# Patient Record
Sex: MALE | Race: BLACK OR AFRICAN AMERICAN | NOT HISPANIC OR LATINO | Employment: OTHER | URBAN - METROPOLITAN AREA
[De-identification: names, ages, dates, MRNs, and addresses within clinical notes are randomized per-mention and may not be internally consistent; named-entity substitution may affect disease eponyms.]

---

## 2017-06-29 ENCOUNTER — OFFICE VISIT (OUTPATIENT)
Dept: OPHTHALMOLOGY | Facility: CLINIC | Age: 72
End: 2017-06-29
Attending: OPHTHALMOLOGY
Payer: COMMERCIAL

## 2017-06-29 DIAGNOSIS — E11.9 TYPE 2 DIABETES MELLITUS WITHOUT RETINOPATHY (H): Primary | ICD-10-CM

## 2017-06-29 DIAGNOSIS — H04.123 DRY EYE SYNDROME, BILATERAL: ICD-10-CM

## 2017-06-29 DIAGNOSIS — Z96.1 PSEUDOPHAKIA: ICD-10-CM

## 2017-06-29 PROCEDURE — 92015 DETERMINE REFRACTIVE STATE: CPT | Mod: ZF

## 2017-06-29 PROCEDURE — 99213 OFFICE O/P EST LOW 20 MIN: CPT | Mod: ZF

## 2017-06-29 ASSESSMENT — REFRACTION_MANIFEST
OD_CYLINDER: +1.25
OS_ADD: +2.75
OS_SPHERE: -0.25
OD_ADD: +2.75
OS_AXIS: 160
OS_CYLINDER: +0.25
OD_SPHERE: -0.75
OD_AXIS: 140

## 2017-06-29 ASSESSMENT — SLIT LAMP EXAM - LIDS
COMMENTS: NORMAL
COMMENTS: NORMAL

## 2017-06-29 ASSESSMENT — VISUAL ACUITY
OD_SC+: -2
METHOD: SNELLEN - LINEAR
OS_SC: 20/20
OD_SC: 20/40

## 2017-06-29 ASSESSMENT — REFRACTION_WEARINGRX
OD_CYLINDER: +1.00
OS_AXIS: 160
OS_CYLINDER: +0.25
OD_AXIS: 145
OS_SPHERE: -0.25
OD_SPHERE: -0.50

## 2017-06-29 ASSESSMENT — CUP TO DISC RATIO
OS_RATIO: 0.3
OD_RATIO: 0.3

## 2017-06-29 ASSESSMENT — TONOMETRY
OS_IOP_MMHG: 10
IOP_METHOD: TONOPEN
OD_IOP_MMHG: 11

## 2017-06-29 ASSESSMENT — EXTERNAL EXAM - RIGHT EYE: OD_EXAM: NORMAL

## 2017-06-29 ASSESSMENT — EXTERNAL EXAM - LEFT EYE: OS_EXAM: NORMAL

## 2017-06-29 NOTE — NURSING NOTE
Chief Complaints and History of Present Illnesses   Patient presents with     Follow Up For     Type 2 diabetes mellitus without retinopathy (H)     HPI    Affected eye(s):  Both   Symptoms:     Blurred vision   No decreased vision         Do you have eye pain now?:  No      Comments:  BS: Okay per patient. Does not check them every day. 130 yesterday afternoon.  Lab Results       Component                Value               Date                       A1C                      6.9                 08/18/2014              Donna ESCOBAR 10:36 AM June 29, 2017

## 2017-06-29 NOTE — MR AVS SNAPSHOT
After Visit Summary   2017    Kong Haskins    MRN: [id number]           Patient Information     Date Of Birth          1945        Visit Information        Provider Department      2017 10:00 AM Abundio Fried MD; [facility]        Today's Diagnoses     Type 2 diabetes mellitus without retinopathy (H)    -  1    Pseudophakia - Both Eyes        Dry eye syndrome, bilateral           Follow-ups after your visit        Follow-up notes from your care team     Return in about 1 year (around 2018) for DFE.      Who to contact     Please call your clinic at [phone] to:    Ask questions about your health    Make or cancel appointments    Discuss your medicines    Learn about your test results    Speak to your doctor   If you have compliments or concerns about an experience at your clinic, or if you wish to file a complaint, please contact [facility] Patient Relations at [phone] or email us at [email or web address]         Additional Information About Your Visit        MyChart Information     [employer] is an electronic gateway that provides easy, online access to your medical records. With [employer], you can request a clinic appointment, read your test results, renew a prescription or communicate with your care team.     To sign up for [employer] visit the website at [email or web address]   You will be asked to enter the access code listed below, as well as some personal information. Please follow the directions to create your username and password.     Your access code is: [email or web address]  Expires: 2017  6:30 AM     Your access code will  in 90 days. If you need help or a new code, please contact your [facility] or call [phone] for assistance.        Care EveryWhere ID     This is your Care EveryWhere ID. This could be used by other organizations to access your [place]  medical records  [id number]         Blood Pressure from Last 3 Encounters:   08/18/14 132/72   06/02/14 116/67   05/12/14 115/73    Weight from Last 3 Encounters:   08/18/14 82.7 kg (182 lb 6.4 oz)   06/02/14 81.6 kg (180 lb)   05/17/13 80.8 kg (178 lb 3.2 oz)              Today, you had the following     No orders found for display       Primary Care Provider Office Phone # Fax #    Ezekiel Andrea MD Pema [phone]       [facility] [address] [facility] [address]        Equal Access to Services     AC ARCHER : Hadii tabitha hernandezo Soabhishek, waaxda luqadaha, qaybta kaalmada bess, stephany neal. So [facility] [place] [phone].    ATENCIÓN: Si habla español, tiene a lennon disposición servicios gratuitos de asistencia lingüística. Ll[facility] [phone].    We comply with applicable federal civil rights laws and [place] laws. We do not discriminate on the basis of race, color, national origin, age, disability sex, sexual orientation or gender identity.            Thank you!     Thank you for choosing EYE CLINIC  for your care. Our goal is always to provide you with excellent care. Hearing back from our patients is one way we can continue to improve our services. Please take a few minutes to complete the written survey that you may receive in the mail after your visit with us. Thank you!             Your Updated Medication List - Protect others around you: Learn how to safely use, store and throw away your medicines at www.disposemymeds.org.          This list is accurate as of: 6/29/17 11:24 AM.  Always use your most recent med list.                   Brand Name Dispense Instructions for use Diagnosis    ASPIRIN PO      Take 81 mg by mouth daily        blood glucose monitoring lancets     1 Box    Use to test daily    DM type 2 (diabetes mellitus, type 2) (H)       blood glucose monitoring test strip    PRISCILLA CONTOUR NEXT    100 each    Test  daily.    Type 2 diabetes  mellitus with hyperglycemia, unspecified long term insulin use status (H)       docusate sodium 100 MG capsule    COLACE     Take  by mouth 2 times daily. Take 1-2 caps daily    DM type 2 (diabetes mellitus, type 2) (H), Type II or unspecified type diabetes mellitus without mention of complication, uncontrolled       fluticasone 50 MCG/ACT spray    FLONASE     Spray 2 sprays into both nostrils daily        hypromellose 0.3 % Gel ophthalmic gel    GENTEAL    1 Bottle    Place 0.1 g (2 drops) into both eyes 4 times daily    Dry eye syndrome, bilateral       losartan-hydrochlorothiazide 50-12.5 MG per tablet    HYZAAR     Take 1 tablet by mouth daily.    DM type 2 (diabetes mellitus, type 2) (H), Type II or unspecified type diabetes mellitus without mention of complication, uncontrolled       metFORMIN 500 MG 24 hr tablet    GLUCOPHAGE-XR    30 tablet    Take 1 tablet (500 mg) by mouth daily (with dinner)    Type 2 diabetes mellitus without retinopathy (H), Dyslipidaemia       SIMVASTATIN PO      Take 40 mg by mouth daily        tamsulosin 0.4 MG capsule    FLOMAX     Take  by mouth daily. Take one twice daily    DM type 2 (diabetes mellitus, type 2) (H), Type II or unspecified type diabetes mellitus without mention of complication, uncontrolled       VITAMIN D3 PO      Take by mouth daily

## 2017-06-29 NOTE — PROGRESS NOTES
Assessment & Plan      Kong Haskins is a 72 year old male with the following diagnoses:   1. Type 2 diabetes mellitus without retinopathy (H)    2. Pseudophakia - Both Eyes    3. Dry eye syndrome, bilateral       No retinopathy  Stressed good glycemic and hypertensive control  Doing well s/p cataract extraction   Glasses prescription updated  Artificial tears twice a day and as needed   Monitor yearly         Patient disposition:   Return in about 1 year (around 6/29/2018) for DFE.          Attending Physician Attestation:  Complete documentation of historical and exam elements from today's encounter can be found in the full encounter summary report (not reduplicated in this progress note).  I personally obtained the chief complaint(s) and history of present illness.  I confirmed and edited as necessary the review of systems, past medical/surgical history, family history, social history, and examination findings as documented by others; and I examined the patient myself.  I personally reviewed the relevant tests, images, and reports as documented above.  I formulated and edited as necessary the assessment and plan and discussed the findings and management plan with the patient and family. . - Abundio Fried MD

## 2017-06-29 NOTE — LETTER
6/29/2017       RE: Kong Haskins  [address]     [place] [facility] [address]     Dear Colleague,    Thank you for referring your patient, Kong Haskins, to the EYE CLINIC at [facility]. Please see a copy of my visit note below.    Assessment & Plan      Kong Haskins is a 72 year old male with the following diagnoses:   1. Type 2 diabetes mellitus without retinopathy (H)    2. Pseudophakia - Both Eyes    3. Dry eye syndrome, bilateral       No retinopathy  Stressed good glycemic and hypertensive control  Doing well s/p cataract extraction   Glasses prescription updated  Artificial tears twice a day and as needed   Monitor yearly         Patient disposition:   Return in about 1 year (around 6/29/2018) for DFE.          Attending Physician Attestation:  Complete documentation of historical and exam elements from today's encounter can be found in the full encounter summary report (not reduplicated in this progress note).  I personally obtained the chief complaint(s) and history of present illness.  I confirmed and edited as necessary the review of systems, past medical/surgical history, family history, social history, and examination findings as documented by others; and I examined the patient myself.  I personally reviewed the relevant tests, images, and reports as documented above.  I formulated and edited as necessary the assessment and plan and discussed the findings and management plan with the patient and family. . - Abundio Fried MD       Again, thank you for allowing me to participate in the care of your patient.      Sincerely,    Abundio Fried MD

## 2018-07-20 ENCOUNTER — MEDICAL CORRESPONDENCE (OUTPATIENT)
Dept: HEALTH INFORMATION MANAGEMENT | Facility: CLINIC | Age: 73
End: 2018-07-20

## 2018-08-08 ENCOUNTER — OFFICE VISIT (OUTPATIENT)
Dept: OPHTHALMOLOGY | Facility: CLINIC | Age: 73
End: 2018-08-08
Payer: COMMERCIAL

## 2018-08-08 DIAGNOSIS — Z96.1 PSEUDOPHAKIA OF BOTH EYES: ICD-10-CM

## 2018-08-08 DIAGNOSIS — H52.4 PRESBYOPIA: ICD-10-CM

## 2018-08-08 DIAGNOSIS — E11.9 TYPE 2 DIABETES MELLITUS WITHOUT RETINOPATHY (H): Primary | ICD-10-CM

## 2018-08-08 RX ORDER — POLYETHYLENE GLYCOL 3350 17 G/17G
POWDER, FOR SOLUTION ORAL
Refills: 11 | Status: ON HOLD | COMMUNITY
Start: 2018-06-19 | End: 2023-06-07

## 2018-08-08 RX ORDER — LOSARTAN POTASSIUM AND HYDROCHLOROTHIAZIDE 25; 100 MG/1; MG/1
TABLET ORAL
Refills: 3 | Status: ON HOLD | COMMUNITY
Start: 2018-07-16 | End: 2023-06-07

## 2018-08-08 RX ORDER — ATORVASTATIN CALCIUM 80 MG/1
TABLET, FILM COATED ORAL
Refills: 3 | Status: ON HOLD | COMMUNITY
Start: 2018-07-16 | End: 2023-06-07

## 2018-08-08 ASSESSMENT — VISUAL ACUITY
CORRECTION_TYPE: GLASSES
OD_CC: 20/25
OD_CC+: +1
OS_CC: 20/20
METHOD: SNELLEN - LINEAR

## 2018-08-08 ASSESSMENT — REFRACTION_MANIFEST
OS_SPHERE: -0.50
OS_ADD: +2.50
OD_AXIS: 140
OS_AXIS: 160
OS_CYLINDER: +0.25
OD_SPHERE: -0.75
OD_CYLINDER: +1.25
OD_ADD: +2.50

## 2018-08-08 ASSESSMENT — CONF VISUAL FIELD
OD_NORMAL: 1
OS_NORMAL: 1
METHOD: COUNTING FINGERS

## 2018-08-08 ASSESSMENT — TONOMETRY
IOP_METHOD: ICARE
OS_IOP_MMHG: 9
OD_IOP_MMHG: 8

## 2018-08-08 ASSESSMENT — CUP TO DISC RATIO
OD_RATIO: 0.3
OS_RATIO: 0.3

## 2018-08-08 ASSESSMENT — EXTERNAL EXAM - LEFT EYE: OS_EXAM: NORMAL

## 2018-08-08 ASSESSMENT — REFRACTION_WEARINGRX
OS_SPHERE: PLANO
OS_ADD: +2.50
OD_ADD: +2.50
OS_CYLINDER: +0.25
OD_AXIS: 145
OD_CYLINDER: +0.75
OD_SPHERE: -0.75
OS_AXIS: 160

## 2018-08-08 ASSESSMENT — EXTERNAL EXAM - RIGHT EYE: OD_EXAM: NORMAL

## 2018-08-08 ASSESSMENT — SLIT LAMP EXAM - LIDS
COMMENTS: NORMAL
COMMENTS: NORMAL

## 2018-08-08 NOTE — MR AVS SNAPSHOT
After Visit Summary   2018    Kong Haskins    MRN: [id number]           Patient Information     Date Of Birth          1945        Visit Information        Provider Department      2018 11:45 AM Chandler Burgos, OD; [facility]        Today's Diagnoses     Type 2 diabetes mellitus without retinopathy (H)    -  1    Pseudophakia of both eyes        Presbyopia           Follow-ups after your visit        Who to contact     Please call your clinic at [phone] to:    Ask questions about your health    Make or cancel appointments    Discuss your medicines    Learn about your test results    Speak to your doctor            Additional Information About Your Visit        MyChart Information     [employer] is an electronic gateway that provides easy, online access to your medical records. With [employer], you can request a clinic appointment, read your test results, renew a prescription or communicate with your care team.     To sign up for [employer] visit the website at [email or web address]   You will be asked to enter the access code listed below, as well as some personal information. Please follow the directions to create your username and password.     Your access code is: [email or web address]  Expires: 10/23/2018  6:31 AM     Your access code will  in 90 days. If you need help or a new code, please contact your [facility] or call [phone] for assistance.        Care EveryWhere ID     This is your Care EveryWhere ID. This could be used by other organizations to access your [place] medical records  [id number]         Blood Pressure from Last 3 Encounters:   14 132/72   14 116/67   14 115/73    Weight from Last 3 Encounters:   14 82.7 kg (182 lb 6.4 oz)   14 81.6 kg (180 lb)   13 80.8 kg (178 lb 3.2 oz)              Today, you had the following     No orders found for display        Primary Care Provider Office Phone # Fax #    Ezekeil Andrea MD Pema [phone]       [facility] [address] [facility] [address]        Equal Access to Services     AC ARCHER : Hadasa tabitha banda maryo Soabhishek, waaxda luqadaha, qaybta kaalmada bess, stephany noblenoe neal. So [facility] [phone].    ATENCIÓN: Si habla español, tiene a lennon disposición servicios gratuitos de asistencia lingüística. Llame al [phone].    We comply with applicable federal civil rights laws and [place] laws. We do not discriminate on the basis of race, color, national origin, age, disability, sex, sexual orientation, or gender identity.            Thank you!     Thank you for choosing [facility]  for your care. Our goal is always to provide you with excellent care. Hearing back from our patients is one way we can continue to improve our services. Please take a few minutes to complete the written survey that you may receive in the mail after your visit with us. Thank you!             Your Updated Medication List - Protect others around you: Learn how to safely use, store and throw away your medicines at www.disposemymeds.org.          This list is accurate as of 8/8/18  1:00 PM.  Always use your most recent med list.                   Brand Name Dispense Instructions for use Diagnosis    ASPIRIN PO      Take 81 mg by mouth daily        atorvastatin 80 MG tablet    LIPITOR          blood glucose monitoring lancets     1 Box    Use to test daily    DM type 2 (diabetes mellitus, type 2) (H)       blood glucose monitoring test strip    PRISCILLA CONTOUR NEXT    100 each    Test  daily.    Type 2 diabetes mellitus with hyperglycemia, unspecified long term insulin use status (H)       docusate sodium 100 MG capsule    COLACE     Take  by mouth 2 times daily. Take 1-2 caps daily    DM type 2 (diabetes mellitus, type 2) (H), Type II or unspecified type diabetes mellitus without mention of  complication, uncontrolled       fluticasone 50 MCG/ACT spray    FLONASE     Spray 2 sprays into both nostrils daily        hypromellose 0.3 % Gel ophthalmic gel    GENTEAL    1 Bottle    Place 0.1 g (2 drops) into both eyes 4 times daily    Dry eye syndrome, bilateral       * losartan-hydrochlorothiazide 50-12.5 MG per tablet    HYZAAR     Take 1 tablet by mouth daily.    DM type 2 (diabetes mellitus, type 2) (H), Type II or unspecified type diabetes mellitus without mention of complication, uncontrolled       * losartan-hydrochlorothiazide 100-25 MG per tablet    HYZAAR          MAPAP 500 MG tablet   Generic drug:  acetaminophen           metFORMIN 500 MG 24 hr tablet    GLUCOPHAGE-XR    30 tablet    Take 1 tablet (500 mg) by mouth daily (with dinner)    Type 2 diabetes mellitus without retinopathy (H), Dyslipidaemia       polyethylene glycol powder    MIRALAX/GLYCOLAX          ranitidine 150 MG tablet    ZANTAC          SIMVASTATIN PO      Take 40 mg by mouth daily        tamsulosin 0.4 MG capsule    FLOMAX     Take  by mouth daily. Take one twice daily    DM type 2 (diabetes mellitus, type 2) (H), Type II or unspecified type diabetes mellitus without mention of complication, uncontrolled       VITAMIN D3 PO      Take by mouth daily        * Notice:  This list has 2 medication(s) that are the same as other medications prescribed for you. Read the directions carefully, and ask your doctor or other care provider to review them with you.

## 2018-08-08 NOTE — PROGRESS NOTES
Assessment/Plan  (E11.9) Type 2 diabetes mellitus without retinopathy (H)  (primary encounter diagnosis)  Plan: Discussed findings with patient. Encouraged continued blood glucose/pressure/lipid control. Patient should RTC annually for dilated eye exam or sooner with any vision changes.    (Z96.1) Pseudophakia of both eyes  Comment: Appropriate appearance both eyes.   Plan: Monitor each year.     (H52.4) Presbyopia  Comment: Minor distance refractive error. Patient correctable to 20/20 in each eye  Plan: SRx updated and released. FTW of glasses not necessary but may improve patient's distance vision slightly. Monitor annually.       Complete documentation of historical and exam elements from today's encounter can  be found in the full encounter summary report (not reduplicated in this progress  note). I personally obtained the chief complaint(s) and history of present illness. I  confirmed and edited as necessary the review of systems, past medical/surgical  history, family history, social history, and examination findings as documented by  others; and I examined the patient myself. I personally reviewed the relevant tests,  images, and reports as documented above. I formulated and edited as necessary the  assessment and plan and discussed the findings and management plan with the  patient and family.    Chandler Burgos OD

## 2018-08-08 NOTE — NURSING NOTE
Chief Complaints and History of Present Illnesses   Patient presents with     Eye Exam For Diabetes      HPI    Affected eye(s):  Both   Symptoms:     Blurred vision            Comments:  New patient here for diabetic eye exam.  Says seeing at near is bothering him.  Some trouble seeing small things at distance.    Eye meds: AT's  QUANG Barrett 8/8/2018 12:38 PM

## 2018-08-08 NOTE — LETTER
8/8/2018      RE: Kong Haskins  [address]   [address]  [place] [place] [address]       Assessment/Plan  (E11.9) Type 2 diabetes mellitus without retinopathy (H)  (primary encounter diagnosis)  Plan: Discussed findings with patient. Encouraged continued blood glucose/pressure/lipid control. Patient should RTC annually for dilated eye exam or sooner with any vision changes.    (Z96.1) Pseudophakia of both eyes  Comment: Appropriate appearance both eyes.   Plan: Monitor each year.     (H52.4) Presbyopia  Comment: Minor distance refractive error. Patient correctable to 20/20 in each eye  Plan: SRx updated and released. FTW of glasses not necessary but may improve patient's distance vision slightly. Monitor annually.       Complete documentation of historical and exam elements from today's encounter can  be found in the full encounter summary report (not reduplicated in this progress  note). I personally obtained the chief complaint(s) and history of present illness. I  confirmed and edited as necessary the review of systems, past medical/surgical  history, family history, social history, and examination findings as documented by  others; and I examined the patient myself. I personally reviewed the relevant tests,  images, and reports as documented above. I formulated and edited as necessary the  assessment and plan and discussed the findings and management plan with the  patient and family.    Chandler Burgos OD

## 2019-08-07 ENCOUNTER — OFFICE VISIT (OUTPATIENT)
Dept: OPHTHALMOLOGY | Facility: CLINIC | Age: 74
End: 2019-08-07
Payer: COMMERCIAL

## 2019-08-07 DIAGNOSIS — E11.9 TYPE 2 DIABETES MELLITUS WITHOUT RETINOPATHY (H): Primary | ICD-10-CM

## 2019-08-07 DIAGNOSIS — Z96.1 PSEUDOPHAKIA OF BOTH EYES: ICD-10-CM

## 2019-08-07 DIAGNOSIS — H52.4 PRESBYOPIA: ICD-10-CM

## 2019-08-07 ASSESSMENT — REFRACTION_MANIFEST
OS_CYLINDER: +0.25
OD_SPHERE: -0.50
OS_AXIS: 150
OD_CYLINDER: +1.00
OS_ADD: +2.75
OD_ADD: +2.75
OS_SPHERE: PLANO
OD_AXIS: 150

## 2019-08-07 ASSESSMENT — VISUAL ACUITY
OD_CC+: +2
METHOD: SNELLEN - LINEAR
OS_CC: J1+
OD_PH_CC+: -2
OD_CC: 20/40
OD_PH_CC: 20/25
OD_CC: J1+
CORRECTION_TYPE: GLASSES
OS_CC: 20/25

## 2019-08-07 ASSESSMENT — REFRACTION_WEARINGRX
OD_SPHERE: -1.00
OS_CYLINDER: +0.25
OS_ADD: +2.50
OS_SPHERE: PLANO
OD_ADD: +2.50
OS_AXIS: 156
SPECS_TYPE: BIFOCAL
OD_CYLINDER: +1.00
OD_AXIS: 146

## 2019-08-07 ASSESSMENT — CONF VISUAL FIELD
OS_NORMAL: 1
METHOD: COUNTING FINGERS
OD_NORMAL: 1

## 2019-08-07 ASSESSMENT — CUP TO DISC RATIO
OD_RATIO: 0.3
OS_RATIO: 0.3

## 2019-08-07 ASSESSMENT — TONOMETRY
OS_IOP_MMHG: 7
IOP_METHOD: ICARE
OD_IOP_MMHG: 7

## 2019-08-07 ASSESSMENT — EXTERNAL EXAM - LEFT EYE: OS_EXAM: NORMAL

## 2019-08-07 ASSESSMENT — EXTERNAL EXAM - RIGHT EYE: OD_EXAM: NORMAL

## 2019-08-07 ASSESSMENT — SLIT LAMP EXAM - LIDS
COMMENTS: NORMAL
COMMENTS: NORMAL

## 2019-08-07 NOTE — NURSING NOTE
Chief Complaints and History of Present Illnesses   Patient presents with     Annual Eye Exam     Diabetic Eye Exam     Chief Complaint(s) and History of Present Illness(es)     Annual Eye Exam     Laterality: both eyes    Onset: 1 year ago    Quality: blurred vision    Severity: moderate    Timing: in the morning    Course: gradually worsening    Associated symptoms: dryness.  Negative for eye pain, tearing, floaters and flashes    Treatments tried: artificial tears    Response to treatment: moderate improvement    Pain scale: 0/10              Diabetic Eye Exam     Associated symptoms: Negative for tearing    Diabetes Type: Type 2 and taking oral medications    Blood Sugars: is controlled              Comments     Pt here for annual DM 2 CEE.  Pt complains of dry eye and uses art tears for relief PRN.  Pt feels VA is a little worse than last time.  Pt reports occasional itching for last 2 years.  Pt reports no pain or other concerns.    DM 2  Pt does not check BS and does not know what it is.  Lab Results       Component                Value               Date                       A1C                      6.9                 08/18/2014  Pt got tested last week but hasn't gotten results back.         Yen Jensen August 7, 2019 9:13 AM

## 2019-08-07 NOTE — PROGRESS NOTES
Assessment/Plan  (E11.9) Type 2 diabetes mellitus without retinopathy (H)  (primary encounter diagnosis)  Comment: Stable to last year's exam  Plan: Continue following PCP's recommendations regarding blood glucose/pressure/lipid control. Monitor in 1 year.     (Z96.1) Pseudophakia of both eyes  Comment: Appropriate appearance.   Plan: Monitor annually.     (H52.4) Presbyopia  Plan: REFRACTION [17825]        SRx updated and released. Monitor. Small Rx change should help with mild blur OD.       Complete documentation of historical and exam elements from today's encounter can  be found in the full encounter summary report (not reduplicated in this progress  note). I personally obtained the chief complaint(s) and history of present illness. I  confirmed and edited as necessary the review of systems, past medical/surgical  history, family history, social history, and examination findings as documented by  others; and I examined the patient myself. I personally reviewed the relevant tests,  images, and reports as documented above. I formulated and edited as necessary the  assessment and plan and discussed the findings and management plan with the  patient and family.    Chandler Burgos, OD

## 2020-09-15 ENCOUNTER — TRANSCRIBE ORDERS (OUTPATIENT)
Dept: OTHER | Age: 75
End: 2020-09-15

## 2020-09-15 ENCOUNTER — MEDICAL CORRESPONDENCE (OUTPATIENT)
Dept: HEALTH INFORMATION MANAGEMENT | Facility: CLINIC | Age: 75
End: 2020-09-15

## 2020-09-15 DIAGNOSIS — E11.9 TYPE 2 DIABETES MELLITUS WITHOUT COMPLICATION, WITHOUT LONG-TERM CURRENT USE OF INSULIN (H): Primary | ICD-10-CM

## 2021-05-31 ENCOUNTER — RECORDS - HEALTHEAST (OUTPATIENT)
Dept: ADMINISTRATIVE | Facility: CLINIC | Age: 76
End: 2021-05-31

## 2023-06-06 ENCOUNTER — HOSPITAL ENCOUNTER (INPATIENT)
Facility: CLINIC | Age: 78
LOS: 3 days | Discharge: HOME | DRG: 195 | End: 2023-06-09
Attending: FAMILY MEDICINE | Admitting: STUDENT IN AN ORGANIZED HEALTH CARE EDUCATION/TRAINING PROGRAM
Payer: COMMERCIAL

## 2023-06-06 ENCOUNTER — APPOINTMENT (OUTPATIENT)
Dept: GENERAL RADIOLOGY | Facility: CLINIC | Age: 78
DRG: 195 | End: 2023-06-06
Attending: FAMILY MEDICINE
Payer: COMMERCIAL

## 2023-06-06 DIAGNOSIS — J18.9 PNEUMONIA OF LEFT LOWER LOBE DUE TO INFECTIOUS ORGANISM: ICD-10-CM

## 2023-06-06 DIAGNOSIS — Z20.822 LAB TEST NEGATIVE FOR COVID-19 VIRUS: ICD-10-CM

## 2023-06-06 DIAGNOSIS — M79.10 MYALGIA: ICD-10-CM

## 2023-06-06 LAB
ALBUMIN SERPL BCG-MCNC: 4.1 G/DL (ref 3.5–5.2)
ALP SERPL-CCNC: 107 U/L (ref 40–129)
ALT SERPL W P-5'-P-CCNC: 10 U/L (ref 10–50)
ANION GAP SERPL CALCULATED.3IONS-SCNC: 10 MMOL/L (ref 7–15)
AST SERPL W P-5'-P-CCNC: 15 U/L (ref 10–50)
ATRIAL RATE - MUSE: 75 BPM
BASOPHILS # BLD AUTO: 0 10E3/UL (ref 0–0.2)
BASOPHILS NFR BLD AUTO: 1 %
BILIRUB SERPL-MCNC: 0.4 MG/DL
BUN SERPL-MCNC: 11.3 MG/DL (ref 8–23)
CALCIUM SERPL-MCNC: 9.9 MG/DL (ref 8.8–10.2)
CHLORIDE SERPL-SCNC: 98 MMOL/L (ref 98–107)
CREAT SERPL-MCNC: 0.73 MG/DL (ref 0.67–1.17)
CRP SERPL-MCNC: 50.79 MG/L
DEPRECATED HCO3 PLAS-SCNC: 28 MMOL/L (ref 22–29)
DIASTOLIC BLOOD PRESSURE - MUSE: NORMAL MMHG
EOSINOPHIL # BLD AUTO: 0.1 10E3/UL (ref 0–0.7)
EOSINOPHIL NFR BLD AUTO: 2 %
ERYTHROCYTE [DISTWIDTH] IN BLOOD BY AUTOMATED COUNT: 12.7 % (ref 10–15)
ERYTHROCYTE [SEDIMENTATION RATE] IN BLOOD BY WESTERGREN METHOD: 48 MM/HR (ref 0–20)
FLUAV RNA SPEC QL NAA+PROBE: NEGATIVE
FLUBV RNA RESP QL NAA+PROBE: NEGATIVE
GFR SERPL CREATININE-BSD FRML MDRD: >90 ML/MIN/1.73M2
GLUCOSE SERPL-MCNC: 141 MG/DL (ref 70–99)
HCT VFR BLD AUTO: 44.3 % (ref 40–53)
HGB BLD-MCNC: 15.4 G/DL (ref 13.3–17.7)
IMM GRANULOCYTES # BLD: 0.1 10E3/UL
IMM GRANULOCYTES NFR BLD: 1 %
INTERPRETATION ECG - MUSE: NORMAL
LACTATE SERPL-SCNC: 0.7 MMOL/L (ref 0.7–2)
LIPASE SERPL-CCNC: 40 U/L (ref 13–60)
LYMPHOCYTES # BLD AUTO: 2 10E3/UL (ref 0.8–5.3)
LYMPHOCYTES NFR BLD AUTO: 30 %
MCH RBC QN AUTO: 31.5 PG (ref 26.5–33)
MCHC RBC AUTO-ENTMCNC: 34.8 G/DL (ref 31.5–36.5)
MCV RBC AUTO: 91 FL (ref 78–100)
MONOCYTES # BLD AUTO: 0.6 10E3/UL (ref 0–1.3)
MONOCYTES NFR BLD AUTO: 10 %
NEUTROPHILS # BLD AUTO: 3.9 10E3/UL (ref 1.6–8.3)
NEUTROPHILS NFR BLD AUTO: 56 %
NRBC # BLD AUTO: 0 10E3/UL
NRBC BLD AUTO-RTO: 0 /100
NT-PROBNP SERPL-MCNC: 102 PG/ML (ref 0–1800)
P AXIS - MUSE: 54 DEGREES
PLATELET # BLD AUTO: 302 10E3/UL (ref 150–450)
POTASSIUM SERPL-SCNC: 4.5 MMOL/L (ref 3.4–5.3)
PR INTERVAL - MUSE: 168 MS
PROCALCITONIN SERPL IA-MCNC: 0.03 NG/ML
PROT SERPL-MCNC: 8.4 G/DL (ref 6.4–8.3)
QRS DURATION - MUSE: 88 MS
QT - MUSE: 378 MS
QTC - MUSE: 422 MS
R AXIS - MUSE: -41 DEGREES
RBC # BLD AUTO: 4.89 10E6/UL (ref 4.4–5.9)
RSV RNA SPEC NAA+PROBE: NEGATIVE
SARS-COV-2 RNA RESP QL NAA+PROBE: NEGATIVE
SODIUM SERPL-SCNC: 136 MMOL/L (ref 136–145)
SYSTOLIC BLOOD PRESSURE - MUSE: NORMAL MMHG
T AXIS - MUSE: 35 DEGREES
TROPONIN T SERPL HS-MCNC: <6 NG/L
TSH SERPL DL<=0.005 MIU/L-ACNC: 2.96 UIU/ML (ref 0.3–4.2)
VENTRICULAR RATE- MUSE: 75 BPM
WBC # BLD AUTO: 6.7 10E3/UL (ref 4–11)

## 2023-06-06 PROCEDURE — 86140 C-REACTIVE PROTEIN: CPT | Performed by: FAMILY MEDICINE

## 2023-06-06 PROCEDURE — 83605 ASSAY OF LACTIC ACID: CPT | Performed by: FAMILY MEDICINE

## 2023-06-06 PROCEDURE — 83880 ASSAY OF NATRIURETIC PEPTIDE: CPT | Performed by: FAMILY MEDICINE

## 2023-06-06 PROCEDURE — 85652 RBC SED RATE AUTOMATED: CPT | Performed by: FAMILY MEDICINE

## 2023-06-06 PROCEDURE — 82550 ASSAY OF CK (CPK): CPT | Performed by: PHYSICIAN ASSISTANT

## 2023-06-06 PROCEDURE — 83036 HEMOGLOBIN GLYCOSYLATED A1C: CPT | Performed by: PHYSICIAN ASSISTANT

## 2023-06-06 PROCEDURE — 84484 ASSAY OF TROPONIN QUANT: CPT | Performed by: FAMILY MEDICINE

## 2023-06-06 PROCEDURE — 80053 COMPREHEN METABOLIC PANEL: CPT | Performed by: FAMILY MEDICINE

## 2023-06-06 PROCEDURE — 84145 PROCALCITONIN (PCT): CPT | Performed by: FAMILY MEDICINE

## 2023-06-06 PROCEDURE — 96361 HYDRATE IV INFUSION ADD-ON: CPT | Performed by: FAMILY MEDICINE

## 2023-06-06 PROCEDURE — 99285 EMERGENCY DEPT VISIT HI MDM: CPT | Mod: 25 | Performed by: FAMILY MEDICINE

## 2023-06-06 PROCEDURE — 120N000002 HC R&B MED SURG/OB UMMC

## 2023-06-06 PROCEDURE — 99285 EMERGENCY DEPT VISIT HI MDM: CPT | Performed by: FAMILY MEDICINE

## 2023-06-06 PROCEDURE — 83690 ASSAY OF LIPASE: CPT | Performed by: FAMILY MEDICINE

## 2023-06-06 PROCEDURE — 71046 X-RAY EXAM CHEST 2 VIEWS: CPT

## 2023-06-06 PROCEDURE — 258N000003 HC RX IP 258 OP 636: Performed by: FAMILY MEDICINE

## 2023-06-06 PROCEDURE — 87637 SARSCOV2&INF A&B&RSV AMP PRB: CPT | Performed by: FAMILY MEDICINE

## 2023-06-06 PROCEDURE — 258N000003 HC RX IP 258 OP 636

## 2023-06-06 PROCEDURE — 99223 1ST HOSP IP/OBS HIGH 75: CPT | Mod: FS | Performed by: PHYSICIAN ASSISTANT

## 2023-06-06 PROCEDURE — 84443 ASSAY THYROID STIM HORMONE: CPT | Performed by: FAMILY MEDICINE

## 2023-06-06 PROCEDURE — 96365 THER/PROPH/DIAG IV INF INIT: CPT | Performed by: FAMILY MEDICINE

## 2023-06-06 PROCEDURE — 93005 ELECTROCARDIOGRAM TRACING: CPT | Performed by: FAMILY MEDICINE

## 2023-06-06 PROCEDURE — 36415 COLL VENOUS BLD VENIPUNCTURE: CPT | Performed by: FAMILY MEDICINE

## 2023-06-06 PROCEDURE — 250N000011 HC RX IP 250 OP 636: Performed by: FAMILY MEDICINE

## 2023-06-06 PROCEDURE — 85004 AUTOMATED DIFF WBC COUNT: CPT | Performed by: FAMILY MEDICINE

## 2023-06-06 RX ORDER — CEFTRIAXONE 2 G/1
2 INJECTION, POWDER, FOR SOLUTION INTRAMUSCULAR; INTRAVENOUS ONCE
Status: COMPLETED | OUTPATIENT
Start: 2023-06-06 | End: 2023-06-06

## 2023-06-06 RX ORDER — KETOROLAC TROMETHAMINE 15 MG/ML
15 INJECTION, SOLUTION INTRAMUSCULAR; INTRAVENOUS ONCE
Status: COMPLETED | OUTPATIENT
Start: 2023-06-06 | End: 2023-06-06

## 2023-06-06 RX ORDER — SODIUM CHLORIDE 9 MG/ML
INJECTION, SOLUTION INTRAVENOUS CONTINUOUS
Status: DISCONTINUED | OUTPATIENT
Start: 2023-06-06 | End: 2023-06-07

## 2023-06-06 RX ORDER — AZITHROMYCIN 500 MG/5ML
500 INJECTION, POWDER, LYOPHILIZED, FOR SOLUTION INTRAVENOUS ONCE
Status: COMPLETED | OUTPATIENT
Start: 2023-06-06 | End: 2023-06-06

## 2023-06-06 RX ORDER — SODIUM CHLORIDE 9 MG/ML
INJECTION, SOLUTION INTRAVENOUS
Status: COMPLETED
Start: 2023-06-06 | End: 2023-06-06

## 2023-06-06 RX ADMIN — SODIUM CHLORIDE 500 ML: 9 INJECTION, SOLUTION INTRAVENOUS at 18:08

## 2023-06-06 RX ADMIN — Medication 500 ML: at 18:08

## 2023-06-06 RX ADMIN — AZITHROMYCIN MONOHYDRATE 500 MG: 500 INJECTION, POWDER, LYOPHILIZED, FOR SOLUTION INTRAVENOUS at 20:38

## 2023-06-06 RX ADMIN — SODIUM CHLORIDE: 9 INJECTION, SOLUTION INTRAVENOUS at 20:35

## 2023-06-06 RX ADMIN — KETOROLAC TROMETHAMINE 15 MG: 15 INJECTION, SOLUTION INTRAMUSCULAR; INTRAVENOUS at 20:35

## 2023-06-06 RX ADMIN — CEFTRIAXONE 2 G: 2 INJECTION, POWDER, FOR SOLUTION INTRAMUSCULAR; INTRAVENOUS at 20:00

## 2023-06-06 ASSESSMENT — ACTIVITIES OF DAILY LIVING (ADL)
ADLS_ACUITY_SCORE: 35
ADLS_ACUITY_SCORE: 35
ADLS_ACUITY_SCORE: 36

## 2023-06-06 NOTE — ED PROVIDER NOTES
ED Provider Note  [facility]      History     Chief Complaint   Patient presents with     Generalized Body Aches     Patient reports body aches everywhere for the past 2 days. Patient is unable to use steps. All muscles are causing pain. Patient reports never feeling this way before. Patient reports feeling weak.     HPI  Kong Haskins is a 78 year old male with a past medical history of DM2 and hypertension who presents to the Emergency Department seeking evaluation of body wide myalgias. Patient presents with his daughter, who he uses as an . She states that he has had chronic pain, primarily in his back and legs for many years now. 6 days ago, they became much more severe to the point where he is unable to ambulate. Any movement, even eating, exacerbates the pain. He denies any recent cold/Flu symptoms. He notes that the pain is predominantly in his muscles and not his joints. He presents for further evaluation.     Past Medical History  Past Medical History:   Diagnosis Date     BPH (benign prostatic hyperplasia)      Hypertension      Other and unspecified hyperlipidemia      Type 2 diabetes mellitus without complications (H) Diet controlled     Past Surgical History:   Procedure Laterality Date     CATARACT IOL, RT/LT  5-12-14, 6-2-14    RE, LE     PHACOEMULSIFICATION CLEAR CORNEA WITH STANDARD INTRAOCULAR LENS IMPLANT  5/12/2014    Procedure: PHACOEMULSIFICATION CLEAR CORNEA WITH STANDARD INTRAOCULAR LENS IMPLANT;  Surgeon: Abundio Fried MD;  Location: [facility]     PHACOEMULSIFICATION CLEAR CORNEA WITH STANDARD INTRAOCULAR LENS IMPLANT  6/2/2014    Procedure: PHACOEMULSIFICATION CLEAR CORNEA WITH STANDARD INTRAOCULAR LENS IMPLANT;  Surgeon: Abundio Fried MD;  Location: [facility]     ASPIRIN PO  atorvastatin (LIPITOR) 80 MG tablet  blood glucose monitoring (PRISCILLA CONTOUR NEXT) test strip  blood glucose monitoring (PRISCILLA MICROLET) lancets  Cholecalciferol (VITAMIN  "D3 PO)  docusate sodium (COLACE) 100 MG capsule  fluticasone (FLONASE) 50 MCG/ACT nasal spray  hypromellose (GENTEAL) ophthalmic gel 0.3%  losartan-hydrochlorothiazide (HYZAAR) 100-25 MG per tablet  losartan-hydrochlorothiazide (HYZAAR) 50-12.5 MG per tablet  MAPAP 500 MG tablet  metFORMIN (GLUCOPHAGE-XR) 500 MG 24 hr tablet  polyethylene glycol (MIRALAX/GLYCOLAX) powder  ranitidine (ZANTAC) 150 MG tablet  SIMVASTATIN PO  tamsulosin (FLOMAX) 0.4 MG 24 hr capsule      No Known Allergies  Family History  Family History   Problem Relation Age of Onset     Glaucoma No family hx of      Macular Degeneration No family hx of      Social History   Social History     Tobacco Use     Smoking status: Never     Smokeless tobacco: Never   Substance Use Topics     Alcohol use: No     Drug use: No      Past medical history, past surgical history, medications, allergies, family history, and social history were reviewed with the patient. No additional pertinent items.      A medically appropriate review of systems was performed with pertinent positives and negatives noted in the HPI, and all other systems negative.    Physical Exam   BP: 134/81  Pulse: 72  Temp: 98.5  F (36.9  C)  Resp: 18  Height: 170 cm (5' 6.93\")  Weight: 78.9 kg (174 lb)  SpO2: 97 %  Physical Exam  Constitutional:       General: He is not in acute distress.     Appearance: Normal appearance. He is not toxic-appearing.   HENT:      Head: Atraumatic.   Eyes:      General: No scleral icterus.     Conjunctiva/sclera: Conjunctivae normal.   Cardiovascular:      Rate and Rhythm: Normal rate.      Heart sounds: Normal heart sounds.   Pulmonary:      Effort: Pulmonary effort is normal. No respiratory distress.      Breath sounds: Normal breath sounds.   Abdominal:      Palpations: Abdomen is soft.      Tenderness: There is no abdominal tenderness.   Musculoskeletal:         General: No deformity.      Cervical back: Neck supple.      Comments: Patient has generalized " myalgias in both upper and lower extremities.   Skin:     General: Skin is warm.   Neurological:      General: No focal deficit present.      Mental Status: He is alert and oriented to person, place, and time.      Sensory: No sensory deficit.      Motor: No weakness.      Coordination: Coordination normal.         ED Course, Procedures, & Data      Procedures             Medications   0.9% sodium chloride BOLUS (0 mLs Intravenous Stopped 6/6/23 2035)     Followed by   sodium chloride 0.9% infusion ( Intravenous $New Bag 6/6/23 2035)   cefTRIAXone (ROCEPHIN) 2 g vial to attach to  ml bag for ADULTS or NS 50 ml bag for PEDS (0 g Intravenous Stopped 6/6/23 2038)   azithromycin 500 mg (ZITHROMAX) in 0.9% NaCl 250 mL intermittent infusion 500 mg (500 mg Intravenous $New Bag 6/6/23 2038)   ketorolac (TORADOL) injection 15 mg (15 mg Intravenous $Given 6/6/23 2035)     Labs Ordered and Resulted from Time of ED Arrival to Time of ED Departure   COMPREHENSIVE METABOLIC PANEL - Abnormal       Result Value    Sodium 136      Potassium 4.5      Chloride 98      Carbon Dioxide (CO2) 28      Anion Gap 10      Urea Nitrogen 11.3      Creatinine 0.73      Calcium 9.9      Glucose 141 (*)     Alkaline Phosphatase 107      AST 15      ALT 10      Protein Total 8.4 (*)     Albumin 4.1      Bilirubin Total 0.4      GFR Estimate >90     CRP INFLAMMATION - Abnormal    CRP Inflammation 50.79 (*)    ERYTHROCYTE SEDIMENTATION RATE AUTO - Abnormal    Erythrocyte Sedimentation Rate 48 (*)    INFLUENZA A/B, RSV, & SARS-COV2 PCR - Normal    Influenza A PCR Negative      Influenza B PCR Negative      RSV PCR Negative      SARS CoV2 PCR Negative     LIPASE - Normal    Lipase 40     LACTIC ACID WHOLE BLOOD - Normal    Lactic Acid 0.7     TROPONIN T, HIGH SENSITIVITY - Normal    Troponin T, High Sensitivity <6     TSH WITH FREE T4 REFLEX - Normal    TSH 2.96     NT PROBNP INPATIENT - Normal    N terminal Pro BNP Inpatient 102      PROCALCITONIN - Normal    Procalcitonin 0.03     CBC WITH PLATELETS AND DIFFERENTIAL    WBC Count 6.7      RBC Count 4.89      Hemoglobin 15.4      Hematocrit 44.3      MCV 91      MCH 31.5      MCHC 34.8      RDW 12.7      Platelet Count 302      % Neutrophils 56      % Lymphocytes 30      % Monocytes 10      % Eosinophils 2      % Basophils 1      % Immature Granulocytes 1      NRBCs per 100 WBC 0      Absolute Neutrophils 3.9      Absolute Lymphocytes 2.0      Absolute Monocytes 0.6      Absolute Eosinophils 0.1      Absolute Basophils 0.0      Absolute Immature Granulocytes 0.1      Absolute NRBCs 0.0     ROUTINE UA WITH MICROSCOPIC REFLEX TO CULTURE     XR Chest 2 Views   Final Result   IMPRESSION: Patchy opacities at the left lower lobe may represent airspace disease or atelectasis. Pneumonia in this region is a possibility. Right lung appears clear. Normal cardiac silhouette.             Critical care was not performed.     Medical Decision Making  The patient's presentation was of high complexity (an acute health issue posing potential threat to life or bodily function).    The patient's evaluation involved:  ordering and/or review of 3+ test(s) in this encounter (see separate area of note for details)    The patient's management necessitated high risk (a decision regarding hospitalization).      Assessment & Plan      I have reviewed the nursing notes. I have reviewed the findings, diagnosis, plan and need for follow up with the patient.    Patient with diffuse myalgias and diagnosis of left lower lobe pneumonia will be started on antibiotics and admitted to the medicine service for further stabilization and treatment.    Final diagnoses:   Myalgia   Pneumonia of left lower lobe due to infectious organism   Heriberto LOMELI, am serving as a trained medical scribe to document services personally performed by Benji Curtis MD, based on the provider's statements to me.     Benji LOMELI  MD Ever, was physically present and have reviewed and verified the accuracy of this note documented by Heriberto Bennett.      Benji Curtis MD  [facility] EMERGENCY DEPARTMENT  6/6/2023     Benji Curtis MD  06/07/23 5209

## 2023-06-06 NOTE — ED TRIAGE NOTES
Patient reports body aches everywhere for the past 2 days. Patient is unable to use steps. All muscles are causing pain. Patient reports never feeling this way before. Patient reports feeling weak.     Triage Assessment     Row Name 06/06/23 1458       Triage Assessment (Adult)    Airway WDL WDL       Respiratory WDL    Respiratory WDL WDL       Skin Circulation/Temperature WDL    Skin Circulation/Temperature WDL WDL       Cardiac WDL    Cardiac WDL WDL       Peripheral/Neurovascular WDL    Peripheral Neurovascular WDL WDL       Cognitive/Neuro/Behavioral WDL    Cognitive/Neuro/Behavioral WDL WDL

## 2023-06-07 ENCOUNTER — APPOINTMENT (OUTPATIENT)
Dept: OCCUPATIONAL THERAPY | Facility: CLINIC | Age: 78
DRG: 195 | End: 2023-06-07
Attending: PHYSICIAN ASSISTANT
Payer: COMMERCIAL

## 2023-06-07 LAB
ALBUMIN UR-MCNC: NEGATIVE MG/DL
ANION GAP SERPL CALCULATED.3IONS-SCNC: 9 MMOL/L (ref 7–15)
APPEARANCE UR: CLEAR
BILIRUB UR QL STRIP: NEGATIVE
BUN SERPL-MCNC: 9.8 MG/DL (ref 8–23)
C PNEUM DNA SPEC QL NAA+PROBE: NOT DETECTED
CALCIUM SERPL-MCNC: 9.1 MG/DL (ref 8.8–10.2)
CHLORIDE SERPL-SCNC: 104 MMOL/L (ref 98–107)
CK SERPL-CCNC: 30 U/L (ref 39–308)
COLOR UR AUTO: ABNORMAL
CREAT SERPL-MCNC: 0.63 MG/DL (ref 0.67–1.17)
CREAT SERPL-MCNC: 0.76 MG/DL (ref 0.67–1.17)
DEPRECATED HCO3 PLAS-SCNC: 26 MMOL/L (ref 22–29)
ERYTHROCYTE [DISTWIDTH] IN BLOOD BY AUTOMATED COUNT: 12.4 % (ref 10–15)
FLUAV H1 2009 PAND RNA SPEC QL NAA+PROBE: NOT DETECTED
FLUAV H1 RNA SPEC QL NAA+PROBE: NOT DETECTED
FLUAV H3 RNA SPEC QL NAA+PROBE: NOT DETECTED
FLUAV RNA SPEC QL NAA+PROBE: NOT DETECTED
FLUBV RNA SPEC QL NAA+PROBE: NOT DETECTED
GFR SERPL CREATININE-BSD FRML MDRD: >90 ML/MIN/1.73M2
GFR SERPL CREATININE-BSD FRML MDRD: >90 ML/MIN/1.73M2
GLUCOSE BLDC GLUCOMTR-MCNC: 153 MG/DL (ref 70–99)
GLUCOSE BLDC GLUCOMTR-MCNC: 157 MG/DL (ref 70–99)
GLUCOSE BLDC GLUCOMTR-MCNC: 178 MG/DL (ref 70–99)
GLUCOSE BLDC GLUCOMTR-MCNC: 198 MG/DL (ref 70–99)
GLUCOSE BLDC GLUCOMTR-MCNC: 272 MG/DL (ref 70–99)
GLUCOSE BLDC GLUCOMTR-MCNC: 320 MG/DL (ref 70–99)
GLUCOSE SERPL-MCNC: 166 MG/DL (ref 70–99)
GLUCOSE UR STRIP-MCNC: NEGATIVE MG/DL
HADV DNA SPEC QL NAA+PROBE: NOT DETECTED
HBA1C MFR BLD: 7.5 %
HCOV PNL SPEC NAA+PROBE: NOT DETECTED
HCT VFR BLD AUTO: 40.5 % (ref 40–53)
HGB BLD-MCNC: 14.2 G/DL (ref 13.3–17.7)
HGB UR QL STRIP: NEGATIVE
HMPV RNA SPEC QL NAA+PROBE: NOT DETECTED
HPIV1 RNA SPEC QL NAA+PROBE: NOT DETECTED
HPIV2 RNA SPEC QL NAA+PROBE: NOT DETECTED
HPIV3 RNA SPEC QL NAA+PROBE: NOT DETECTED
HPIV4 RNA SPEC QL NAA+PROBE: NOT DETECTED
KETONES UR STRIP-MCNC: NEGATIVE MG/DL
LEUKOCYTE ESTERASE UR QL STRIP: NEGATIVE
M PNEUMO DNA SPEC QL NAA+PROBE: NOT DETECTED
MCH RBC QN AUTO: 31.2 PG (ref 26.5–33)
MCHC RBC AUTO-ENTMCNC: 35.1 G/DL (ref 31.5–36.5)
MCV RBC AUTO: 89 FL (ref 78–100)
NITRATE UR QL: NEGATIVE
PH UR STRIP: 7.5 [PH] (ref 5–7)
PLATELET # BLD AUTO: 259 10E3/UL (ref 150–450)
POTASSIUM SERPL-SCNC: 4.2 MMOL/L (ref 3.4–5.3)
RBC # BLD AUTO: 4.55 10E6/UL (ref 4.4–5.9)
RBC URINE: <1 /HPF
RSV RNA SPEC QL NAA+PROBE: NOT DETECTED
RSV RNA SPEC QL NAA+PROBE: NOT DETECTED
RV+EV RNA SPEC QL NAA+PROBE: NOT DETECTED
SODIUM SERPL-SCNC: 139 MMOL/L (ref 136–145)
SP GR UR STRIP: 1.01 (ref 1–1.03)
UROBILINOGEN UR STRIP-MCNC: NORMAL MG/DL
WBC # BLD AUTO: 5.2 10E3/UL (ref 4–11)
WBC URINE: <1 /HPF

## 2023-06-07 PROCEDURE — 120N000002 HC R&B MED SURG/OB UMMC

## 2023-06-07 PROCEDURE — 87486 CHLMYD PNEUM DNA AMP PROBE: CPT | Performed by: PHYSICIAN ASSISTANT

## 2023-06-07 PROCEDURE — 250N000012 HC RX MED GY IP 250 OP 636 PS 637: Performed by: PHYSICIAN ASSISTANT

## 2023-06-07 PROCEDURE — 80048 BASIC METABOLIC PNL TOTAL CA: CPT | Performed by: PHYSICIAN ASSISTANT

## 2023-06-07 PROCEDURE — 85041 AUTOMATED RBC COUNT: CPT | Performed by: PHYSICIAN ASSISTANT

## 2023-06-07 PROCEDURE — 250N000013 HC RX MED GY IP 250 OP 250 PS 637: Performed by: PHYSICIAN ASSISTANT

## 2023-06-07 PROCEDURE — 81001 URINALYSIS AUTO W/SCOPE: CPT | Performed by: FAMILY MEDICINE

## 2023-06-07 PROCEDURE — 258N000003 HC RX IP 258 OP 636: Performed by: FAMILY MEDICINE

## 2023-06-07 PROCEDURE — 258N000003 HC RX IP 258 OP 636

## 2023-06-07 PROCEDURE — 86481 TB AG RESPONSE T-CELL SUSP: CPT | Performed by: STUDENT IN AN ORGANIZED HEALTH CARE EDUCATION/TRAINING PROGRAM

## 2023-06-07 PROCEDURE — 250N000013 HC RX MED GY IP 250 OP 250 PS 637: Performed by: STUDENT IN AN ORGANIZED HEALTH CARE EDUCATION/TRAINING PROGRAM

## 2023-06-07 PROCEDURE — 97165 OT EVAL LOW COMPLEX 30 MIN: CPT | Mod: GO

## 2023-06-07 PROCEDURE — 250N000011 HC RX IP 250 OP 636: Performed by: PHYSICIAN ASSISTANT

## 2023-06-07 PROCEDURE — 97530 THERAPEUTIC ACTIVITIES: CPT | Mod: GO

## 2023-06-07 PROCEDURE — 87633 RESP VIRUS 12-25 TARGETS: CPT | Performed by: PHYSICIAN ASSISTANT

## 2023-06-07 PROCEDURE — 85018 HEMOGLOBIN: CPT | Performed by: PHYSICIAN ASSISTANT

## 2023-06-07 PROCEDURE — 36415 COLL VENOUS BLD VENIPUNCTURE: CPT | Performed by: STUDENT IN AN ORGANIZED HEALTH CARE EDUCATION/TRAINING PROGRAM

## 2023-06-07 PROCEDURE — 99233 SBSQ HOSP IP/OBS HIGH 50: CPT | Performed by: STUDENT IN AN ORGANIZED HEALTH CARE EDUCATION/TRAINING PROGRAM

## 2023-06-07 PROCEDURE — 97535 SELF CARE MNGMENT TRAINING: CPT | Mod: GO

## 2023-06-07 RX ORDER — AMLODIPINE BESYLATE 10 MG/1
10 TABLET ORAL DAILY
Status: DISCONTINUED | OUTPATIENT
Start: 2023-06-08 | End: 2023-06-09 | Stop reason: HOSPADM

## 2023-06-07 RX ORDER — LOSARTAN POTASSIUM AND HYDROCHLOROTHIAZIDE 12.5; 5 MG/1; MG/1
1 TABLET ORAL DAILY
Status: DISCONTINUED | OUTPATIENT
Start: 2023-06-07 | End: 2023-06-07

## 2023-06-07 RX ORDER — CLOBETASOL PROPIONATE 0.5 MG/G
CREAM TOPICAL 2 TIMES DAILY
Status: ON HOLD | COMMUNITY
End: 2023-06-07

## 2023-06-07 RX ORDER — POLYETHYLENE GLYCOL 3350 17 G/17G
17 POWDER, FOR SOLUTION ORAL DAILY PRN
COMMUNITY

## 2023-06-07 RX ORDER — ASPIRIN 81 MG/1
81 TABLET ORAL DAILY
COMMUNITY

## 2023-06-07 RX ORDER — LOSARTAN POTASSIUM 100 MG/1
100 TABLET ORAL DAILY
Status: DISCONTINUED | OUTPATIENT
Start: 2023-06-08 | End: 2023-06-09 | Stop reason: HOSPADM

## 2023-06-07 RX ORDER — TAMSULOSIN HYDROCHLORIDE 0.4 MG/1
0.4 CAPSULE ORAL DAILY
Status: DISCONTINUED | OUTPATIENT
Start: 2023-06-07 | End: 2023-06-09 | Stop reason: HOSPADM

## 2023-06-07 RX ORDER — TAMSULOSIN HYDROCHLORIDE 0.4 MG/1
0.4 CAPSULE ORAL DAILY
COMMUNITY

## 2023-06-07 RX ORDER — HYDROCHLOROTHIAZIDE 12.5 MG/1
12.5 TABLET ORAL DAILY
Status: DISCONTINUED | OUTPATIENT
Start: 2023-06-07 | End: 2023-06-07

## 2023-06-07 RX ORDER — PANTOPRAZOLE SODIUM 40 MG/1
40 TABLET, DELAYED RELEASE ORAL
Status: DISCONTINUED | OUTPATIENT
Start: 2023-06-08 | End: 2023-06-09 | Stop reason: HOSPADM

## 2023-06-07 RX ORDER — PROPRANOLOL HCL 60 MG
60 CAPSULE, EXTENDED RELEASE 24HR ORAL DAILY
Status: DISCONTINUED | OUTPATIENT
Start: 2023-06-08 | End: 2023-06-09 | Stop reason: HOSPADM

## 2023-06-07 RX ORDER — PROPRANOLOL HCL 60 MG
60 CAPSULE, EXTENDED RELEASE 24HR ORAL DAILY
COMMUNITY

## 2023-06-07 RX ORDER — SODIUM CHLORIDE 9 MG/ML
INJECTION, SOLUTION INTRAVENOUS
Status: DISPENSED
Start: 2023-06-07 | End: 2023-06-07

## 2023-06-07 RX ORDER — AZITHROMYCIN 500 MG/1
500 TABLET, FILM COATED ORAL DAILY
Status: COMPLETED | OUTPATIENT
Start: 2023-06-08 | End: 2023-06-09

## 2023-06-07 RX ORDER — VITAMIN B COMPLEX
25 TABLET ORAL DAILY
COMMUNITY

## 2023-06-07 RX ORDER — SODIUM CHLORIDE 9 MG/ML
INJECTION, SOLUTION INTRAVENOUS
Status: COMPLETED
Start: 2023-06-07 | End: 2023-06-07

## 2023-06-07 RX ORDER — LIDOCAINE 40 MG/G
CREAM TOPICAL
Status: DISCONTINUED | OUTPATIENT
Start: 2023-06-07 | End: 2023-06-09 | Stop reason: HOSPADM

## 2023-06-07 RX ORDER — DEXTROSE MONOHYDRATE 25 G/50ML
25-50 INJECTION, SOLUTION INTRAVENOUS
Status: DISCONTINUED | OUTPATIENT
Start: 2023-06-07 | End: 2023-06-09 | Stop reason: HOSPADM

## 2023-06-07 RX ORDER — KETOCONAZOLE 20 MG/ML
SHAMPOO TOPICAL DAILY PRN
COMMUNITY

## 2023-06-07 RX ORDER — LOSARTAN POTASSIUM 50 MG/1
50 TABLET ORAL DAILY
Status: DISCONTINUED | OUTPATIENT
Start: 2023-06-07 | End: 2023-06-07

## 2023-06-07 RX ORDER — ENOXAPARIN SODIUM 100 MG/ML
40 INJECTION SUBCUTANEOUS EVERY 24 HOURS
Status: DISCONTINUED | OUTPATIENT
Start: 2023-06-07 | End: 2023-06-09 | Stop reason: HOSPADM

## 2023-06-07 RX ORDER — ACETAMINOPHEN 325 MG/1
325 TABLET ORAL EVERY 4 HOURS PRN
Status: DISCONTINUED | OUTPATIENT
Start: 2023-06-07 | End: 2023-06-09 | Stop reason: HOSPADM

## 2023-06-07 RX ORDER — AMLODIPINE BESYLATE 10 MG/1
10 TABLET ORAL DAILY
COMMUNITY

## 2023-06-07 RX ORDER — CLOBETASOL PROPIONATE 0.5 MG/G
OINTMENT TOPICAL 2 TIMES DAILY PRN
COMMUNITY

## 2023-06-07 RX ORDER — CEFTRIAXONE 1 G/1
1 INJECTION, POWDER, FOR SOLUTION INTRAMUSCULAR; INTRAVENOUS EVERY 24 HOURS
Status: DISCONTINUED | OUTPATIENT
Start: 2023-06-07 | End: 2023-06-09 | Stop reason: HOSPADM

## 2023-06-07 RX ORDER — ASPIRIN 81 MG/1
81 TABLET, CHEWABLE ORAL DAILY
Status: DISCONTINUED | OUTPATIENT
Start: 2023-06-07 | End: 2023-06-09 | Stop reason: HOSPADM

## 2023-06-07 RX ORDER — LOSARTAN POTASSIUM 100 MG/1
100 TABLET ORAL DAILY
COMMUNITY

## 2023-06-07 RX ORDER — ATORVASTATIN CALCIUM 20 MG/1
20 TABLET, FILM COATED ORAL DAILY
COMMUNITY

## 2023-06-07 RX ORDER — NICOTINE POLACRILEX 4 MG
15-30 LOZENGE BUCCAL
Status: DISCONTINUED | OUTPATIENT
Start: 2023-06-07 | End: 2023-06-09 | Stop reason: HOSPADM

## 2023-06-07 RX ADMIN — INSULIN ASPART 2 UNITS: 100 INJECTION, SOLUTION INTRAVENOUS; SUBCUTANEOUS at 12:00

## 2023-06-07 RX ADMIN — SODIUM CHLORIDE: 9 INJECTION, SOLUTION INTRAVENOUS at 01:32

## 2023-06-07 RX ADMIN — ACETAMINOPHEN 325 MG: 325 TABLET, FILM COATED ORAL at 22:01

## 2023-06-07 RX ADMIN — INSULIN ASPART 1 UNITS: 100 INJECTION, SOLUTION INTRAVENOUS; SUBCUTANEOUS at 17:12

## 2023-06-07 RX ADMIN — HYDROCHLOROTHIAZIDE 12.5 MG: 12.5 TABLET ORAL at 08:48

## 2023-06-07 RX ADMIN — LOSARTAN POTASSIUM 50 MG: 50 TABLET, FILM COATED ORAL at 08:48

## 2023-06-07 RX ADMIN — TAMSULOSIN HYDROCHLORIDE 0.4 MG: 0.4 CAPSULE ORAL at 08:48

## 2023-06-07 RX ADMIN — ASPIRIN 81 MG CHEWABLE TABLET 81 MG: 81 TABLET CHEWABLE at 08:48

## 2023-06-07 RX ADMIN — SODIUM CHLORIDE 1000 ML: 9 INJECTION, SOLUTION INTRAVENOUS at 01:38

## 2023-06-07 RX ADMIN — INSULIN ASPART 1 UNITS: 100 INJECTION, SOLUTION INTRAVENOUS; SUBCUTANEOUS at 22:50

## 2023-06-07 RX ADMIN — CEFTRIAXONE SODIUM 1 G: 1 INJECTION, POWDER, FOR SOLUTION INTRAMUSCULAR; INTRAVENOUS at 20:04

## 2023-06-07 RX ADMIN — ENOXAPARIN SODIUM 40 MG: 40 INJECTION SUBCUTANEOUS at 08:49

## 2023-06-07 RX ADMIN — INSULIN ASPART 1 UNITS: 100 INJECTION, SOLUTION INTRAVENOUS; SUBCUTANEOUS at 08:56

## 2023-06-07 ASSESSMENT — ACTIVITIES OF DAILY LIVING (ADL)
ADLS_ACUITY_SCORE: 26
ADLS_ACUITY_SCORE: 26
EQUIPMENT_CURRENTLY_USED_AT_HOME: CANE, STRAIGHT
WALKING_OR_CLIMBING_STAIRS_DIFFICULTY: YES
TRANSFERRING: 1-->ASSISTANCE (EQUIPMENT/PERSON) NEEDED
VISION_MANAGEMENT: GLASSES
WEAR_GLASSES_OR_BLIND: YES
WALKING_OR_CLIMBING_STAIRS: STAIR CLIMBING DIFFICULTY, REQUIRES EQUIPMENT
TRANSFERRING: 0-->ASSISTANCE NEEDED (DEVELOPMENTALLY APPROPRIATE)
ADLS_ACUITY_SCORE: 36
TOILETING_ISSUES: NO
CHANGE_IN_FUNCTIONAL_STATUS_SINCE_ONSET_OF_CURRENT_ILLNESS/INJURY: NO
DRESSING/BATHING_DIFFICULTY: NO
ADLS_ACUITY_SCORE: 37
FALL_HISTORY_WITHIN_LAST_SIX_MONTHS: NO
CONCENTRATING,_REMEMBERING_OR_MAKING_DECISIONS_DIFFICULTY: NO
ADLS_ACUITY_SCORE: 26
ADLS_ACUITY_SCORE: 26
DIFFICULTY_EATING/SWALLOWING: NO
DOING_ERRANDS_INDEPENDENTLY_DIFFICULTY: YES
ADLS_ACUITY_SCORE: 26
ADLS_ACUITY_SCORE: 37
ADLS_ACUITY_SCORE: 26
ADLS_ACUITY_SCORE: 37
ADLS_ACUITY_SCORE: 26
ADLS_ACUITY_SCORE: 26

## 2023-06-07 NOTE — PLAN OF CARE
"/72 (BP Location: Right arm, Patient Position: Semi-Capps's)   Pulse 71   Temp 97.4  F (36.3  C) (Oral)   Resp 18   Ht 1.7 m (5' 6.93\")   Wt 78.9 kg (174 lb)   SpO2 92%   BMI 27.31 kg/m      Pt transferred to  ortho  A&O X 4, on RA, reports mild cough and generalized weakness, malaise.Pt reports having traveled to [facility] and recently came back. Labs scheduled in the morning to r/o TB. Pt currently on Airborne precautions    Continent of B/B. SBA with Cane. Right PIV infusing NS at 125 ml/hr. Reports baseline BLE numbness    Chest Xray showing opacities, possible pneumonia per notes. Currently on azithromycin and ceftriaxone   , No overnight insulin coverage given.  Reports 7/10 generalized pain but declines pain meds    Sputum and urine culture to be collected.    Will continue with POC  "

## 2023-06-07 NOTE — H&P
[facility]    History and Physical - Hospitalist Service, GOLD TEAM        Date of Admission:  6/6/2023    Assessment & Plan        Chief Complaint  Full body pin    History obtained from: Patient and family     HPI    Kong Haskins is a 78 year old male with a past medical history of DM2 and hypertension who presents to the Emergency Department seeking evaluation of body wide myalgias. Patient presents with his daughter, who he uses as an .     Patient reports chronic low back pain since 2000 that runs down both his legs at times, associated with bilateral foot numbness that is unchanged from previous, no bowel or bladder incontinence no saddle anesthesia.     In addition for the past 5 days he has experienced a more diffuse body pain, pointing to his neck, shoulders, upper arms and chest. It is described and achy and fairly persistent, worse at night, and better with movement.     Due to the above symptoms he has been feeling too weak to care for himself. He lives alone. It is getting difficult for him to walk but he can mostly get around with a cane still.    He returned from [facility] at the end of May where he was visiting his son.     He does have a cough that initially he states is productive of white, phlegm then later reports cough is not too bad.     In the ED, CBC wnl. cmp wnl. Troponin is negative. . CRP 50.79. ESR 48.     CXR Patchy opacities at the left lower lobe may represent airspace disease or atelectasis. Pneumonia in this region is a possibility    Azithromycin, ceftiraxone given in the ED     # Community acquired pneumonia  - CXR with infiltrate and + cough   - Ceftriaxone x 5 days   - azithromycin x 3 days   - extended viral panel  - low likelihood TB given no fever, night sweats, but given recent travel to [facility] with new onset cough out of abundance of caution will order TB precautions and TB gold, and discuss with ID in AM   -  continue IVF for now    # Diffuse myalgias  Generalized subjective weakness  Failure to thrive  - There seems to be a degree of radicular back pain that is chronic, but on top of this a more acute pain in the neck, shoulders, upper back, arms. On exam patient complains of tenderness throughout. No focal neuro deficits or true weakness.   - possibly this is related to acute infectious process as above   - However, given the relatively mild cough and lack of infectious symptoms consider other etiology especially in light of elevated inflammatory markers, differential includes PMR based on the distribution, spondyloarthropathy, polymyositis, among others , could be statin induced but seems he has been on this for a long time  Plan  - repeat inflammatory markers after treatment for infection, if symptoms and elevated inflammatory markers continue consider rheumatology referral   - add on ck level  - PT and OT    # Type II DM   - stop metformin  - cover with correctional insulin    # Hypertension   - continue losartan-hydrochlorothiazide (HYZAAR) 100-25 MG per tablet    # HLD  - hold statin for now due to myalgias     # BPH   - noted    DVT Prophylaxis:   - subcutaneous lovenox     Diet:   Regular diet   ______________________________________________________________________      Review of Systems    The 10 point Review of Systems is negative other than noted in the HPI or here.     Exam    Physical Exam   Vital Signs: Temp: 98.5  F (36.9  C) Temp src: Oral BP: 134/81 Pulse: 72   Resp: 18 SpO2: 97 % O2 Device: None (Room air)    Weight: 174 lbs 0 oz    General Appearance: Alert and oriented x3,   HEENT: Anicteric sclera, MMM   Respiratory: Breathing comfortably on room air, lungs CTAB without wheezing or crackles   Cardiovascular: RRR, S1, S2. No murmur noted  GI: Abdomen soft, non-tender with active bowel sounds. No guarding or rebound  Lymph/Hematologic: No peripheral edema, distal pulses palpable   Skin: No rash or  jaundice   Musculoskeletal: Moves all extremities. Tender throughout upper back neck, arms   Neurologic: No focal deficits, CN II-XII grossly intact, no focal deficits.    Past Medical History    Past Medical History    I have reviewed this patient's medical history and updated it with pertinent information if needed.   Past Medical History:   Diagnosis Date     BPH (benign prostatic hyperplasia)      Hypertension      Other and unspecified hyperlipidemia      Type 2 diabetes mellitus without complications (H) Diet controlled       Past Surgical History   I have reviewed this patient's surgical history and updated it with pertinent information if needed.  Past Surgical History:   Procedure Laterality Date     CATARACT IOL, RT/LT  5-12-14, 6-2-14    RE, LE     PHACOEMULSIFICATION CLEAR CORNEA WITH STANDARD INTRAOCULAR LENS IMPLANT  5/12/2014    Procedure: PHACOEMULSIFICATION CLEAR CORNEA WITH STANDARD INTRAOCULAR LENS IMPLANT;  Surgeon: Abundio Fried MD;  [facility]: [facility]     PHACOEMULSIFICATION CLEAR CORNEA WITH STANDARD INTRAOCULAR LENS IMPLANT  6/2/2014    Procedure: PHACOEMULSIFICATION CLEAR CORNEA WITH STANDARD INTRAOCULAR LENS IMPLANT;  Surgeon: Abundio Fried MD;  Location: [facility]       Social History   I have reviewed this patient's social history and updated it with pertinent information if needed.  Social History     Tobacco Use     Smoking status: Never     Smokeless tobacco: Never   Substance Use Topics     Alcohol use: No     Drug use: No       Family History         Prior to Admission Medications   Prior to Admission Medications   Prescriptions Last Dose Informant Patient Reported? Taking?   ASPIRIN PO   Yes No   Sig: Take 81 mg by mouth daily   Cholecalciferol (VITAMIN D3 PO)   Yes No   Sig: Take by mouth daily   MAPAP 500 MG tablet   Yes No   SIMVASTATIN PO   Yes No   Sig: Take 40 mg by mouth daily   atorvastatin (LIPITOR) 80 MG tablet   Yes No   blood glucose monitoring (PRISCILLA CONTOUR  "NEXT) test strip   No No   Sig: Test  daily.   blood glucose monitoring (PRISCILLA MICROLET) lancets   No No   Sig: Use to test daily   docusate sodium (COLACE) 100 MG capsule   Yes No   Sig: Take  by mouth 2 times daily. Take 1-2 caps daily   fluticasone (FLONASE) 50 MCG/ACT nasal spray   Yes No   Sig: Spray 2 sprays into both nostrils daily   hypromellose (GENTEAL) ophthalmic gel 0.3%   No No   Sig: Place 0.1 g (2 drops) into both eyes 4 times daily   losartan-hydrochlorothiazide (HYZAAR) 100-25 MG per tablet   Yes No   losartan-hydrochlorothiazide (HYZAAR) 50-12.5 MG per tablet   Yes No   Sig: Take 1 tablet by mouth daily.   metFORMIN (GLUCOPHAGE-XR) 500 MG 24 hr tablet   No No   Sig: Take 1 tablet (500 mg) by mouth daily (with dinner)   polyethylene glycol (MIRALAX/GLYCOLAX) powder   Yes No   ranitidine (ZANTAC) 150 MG tablet   Yes No   tamsulosin (FLOMAX) 0.4 MG 24 hr capsule   Yes No   Sig: Take  by mouth daily. Take one twice daily      Facility-Administered Medications: None     Allergies   No Known Allergies    Inpatient bundle  Rosales Catheter: Not present    Lines: None       Cardiac Monitoring: None    Code Status:   full code    Clinically Significant Risk Factors Present on Admission                # Drug Induced Platelet Defect: home medication list includes an antiplatelet medication   # Hypertension: Home medication list includes antihypertensive(s)      # Overweight: Estimated body mass index is 27.31 kg/m  as calculated from the following:    Height as of this encounter: 1.7 m (5' 6.93\").    Weight as of this encounter: 78.9 kg (174 lb).            Disposition Plan      Expected Discharge Date: 06/07/2023                Sharif Moreno PA-C  Hospitalist Service, [facility]  Securely message with [employer] (more info)  Text page via [employer] Paging/Directory     Data reviewed     Data   Data reviewed today: I reviewed all medications, new labs and " imaging results over the last 24 hours. See A/P for discussion on these results.     Recent Labs   Lab 06/06/23  1745   WBC 6.7   HGB 15.4   MCV 91         POTASSIUM 4.5   CHLORIDE 98   CO2 28   BUN 11.3   CR 0.73   ANIONGAP 10   LAURIE 9.9   *   ALBUMIN 4.1   PROTTOTAL 8.4*   BILITOTAL 0.4   ALKPHOS 107   ALT 10   AST 15   LIPASE 40       Recent Results (from the past 24 hour(s))   XR Chest 2 Views    Narrative    EXAM: XR CHEST 2 VIEWS  LOCATION: [facility]  DATE/TIME: 6/6/2023 6:39 PM CDT    INDICATION: Myalgias.  COMPARISON: None.      Impression    IMPRESSION: Patchy opacities at the left lower lobe may represent airspace disease or atelectasis. Pneumonia in this region is a possibility. Right lung appears clear. Normal cardiac silhouette.       Billing  Medical Decision Making       60 MINUTES SPENT BY ME on the date of service doing chart review, history, exam, documentation & further activities per the note.

## 2023-06-07 NOTE — PLAN OF CARE
"VS: /74 (BP Location: Right arm, Patient Position: Supine)   Pulse 73   Temp 98.2  F (36.8  C) (Oral)   Resp 16   Ht 1.7 m (5' 6.93\")   Wt 78.9 kg (174 lb)   SpO2 99%   BMI 27.31 kg/m     O2: >90% on room air. Denies SOB/n/V/chest pain    Output: Voiding spontaneously without difficulties    Last BM: 06/05/23   Activity: Up ad ailin with cane    Skin: Decline full skin assessment. Visible skin intact    Pain: Denies pain    CMS: Alert and oriented x3. Denies numbness and tingling. [place] speaking,  utilize    Dressing: NA   Diet: Regular diet    LDA: PIV saline locked    Equipment: IV pole/pump, cane, personal belongings, call light within patient reach    Plan: Per MD to repeat inflammatory markers tomorrow    Additional Info:         "

## 2023-06-07 NOTE — PROGRESS NOTES
06/07/23 0930   Appointment Info   Signing Clinician's Name / Credentials (OT) Tiara Owens MA OTR/L   Rehab Comments (OT) Daughter interpreted       Present yes  ( Ipad not working, daughter able to translate throughout session)   Language Tristanian   Living Environment   People in Home alone   Current Living Arrangements apartment   Home Accessibility no concerns   Transportation Anticipated family or friend will provide   Living Environment Comments Patient lives IND in senior apartment equipped with grab bars near toilet and in tub/shower. Pt has been using regular chair in shower to sit on. Has elevator, does not need to do stairs.   Self-Care   Usual Activity Tolerance good   Current Activity Tolerance moderate   Equipment Currently Used at Home cane, straight   Fall history within last six months no   Activity/Exercise/Self-Care Comment PTA patient utilized SEC outside of apartment, inside does not use AD   Instrumental Activities of Daily Living (IADL)   IADL Comments Pt typically IND with IADLs, reports they are getting harder since being sick   General Information   Onset of Illness/Injury or Date of Surgery 06/07/23   Referring Physician MastermanSharif, PAAntonioC   Patient/Family Therapy Goal Statement (OT) To get a PCA to assist with household tasks   Additional Occupational Profile Info/Pertinent History of Current Problem Kong Haskins is a 78 year old male with a past medical history of DM2 and hypertension who presents to the Emergency Department seeking evaluation of body wide myalgias. Patient presents with his daughter, who he uses as an . Chest Xray showing opacities, possible pneumonia per notes.   Existing Precautions/Restrictions fall;other (see comments)  (airborne prec)   Cognitive Status Examination   Orientation Status orientation to person, place and time   Cognitive Status Comments slightly impulsive and unaware of lines   Visual Perception    Visual Impairment/Limitations WNL   Sensory   Sensory Quick Adds sensation intact   Pain Assessment   Patient Currently in Pain Yes, see Vital Sign flowsheet  (back pain)   Range of Motion Comprehensive   Comment, General Range of Motion B UE's WFL   Strength Comprehensive (MMT)   Comment, General Manual Muscle Testing (MMT) Assessment B UE's WFL   Bed Mobility   Comment (Bed Mobility) IND   Sit-Stand Transfer   Sit-Stand [place] (Transfers) supervision;verbal cues   Bathing Assessment/Intervention   [place] Level (Bathing) supervision   Assistive Devices (Bathing) grab bar, tub/shower;shower chair   Comment, (Bathing) per clinical judgment   Lower Body Dressing Assessment/Training   [place] Level (Lower Body Dressing) independent   Toileting   [place] Level (Toileting) independent   Clinical Impression   Criteria for Skilled Therapeutic Interventions Met (OT) Yes, treatment indicated   OT Diagnosis Decreased IND with ADL   OT Problem List-Impairments impacting ADL problems related to;activity tolerance impaired;pain   Assessment of Occupational Performance 1-3 Performance Deficits   Identified Performance Deficits bathing, IADLs   Planned Therapy Interventions (OT) ADL retraining   Clinical Decision Making Complexity (OT) low complexity   Anticipated Equipment Needs Upon Discharge (OT) shower chair;cane, straight   Risk & Benefits of therapy have been explained evaluation/treatment results reviewed;care plan/treatment goals reviewed;patient;daughter   OT Total Evaluation Time   OT Eval, Low Complexity Minutes (81139) 7   OT Goals   Therapy Frequency (OT) One time eval and treatment   OT Predicted Duration/Target Date for Goal Attainment 06/07/23   OT Goals Lower Body Dressing;Toilet Transfer/Toileting;OT Goal 1   OT: Lower Body Dressing Supervision/stand-by assist;Goal Met   OT: Toilet Transfer/Toileting Supervision/stand-by assist;toilet transfer;cleaning and garment management;Goal Met   OT:  Goal 1 Pt will complete simulated tub/shower transfer with SBA, goal met   Self-Care/Home Management   Self-Care/Home Mgmt/ADL, Compensatory, Meal Prep Minutes (75473) 24   Symptoms Noted During/After Treatment (Meal Preparation/Planning Training) none   Treatment Detail/Skilled Intervention Increased time needed as trying to connect to  IPAD to communicate with pt, however IPAD not working, daughter reported she can interpret for pt. Session focused on safety and IND with ADLs. Pt completed toilet transfer and standing toilet task with distant supervision-IND. Pt educated on safe tub transfer and pt then completed with SBA using grab bars as simulated for home evnt. Per pt and daughter, pt has been sitting on a chair in shower due to fatigue, OT advised to obtain a medical shower chair to increase safety and for energy conservation, educated on where to obtain and pt opted to obtain from [facility], tech to issue prior to dc. Pt was educated to complete LE dressing seated for increased safety and for EC, pt verbalized understanding and demo'd ability to reach down to socks IND. Pt declined concerns with ADLs, voiced concerns more related to laundry, cooking, household tasks. Pt asking about a PCA, stated will relay to CC for direction, however at this time recommended pt rely on family to help him with household tasks, daughter and pt verbalized understanding.   Therapeutic Activities   Therapeutic Activity Minutes (10949) 10   Symptoms noted during/after treatment none   Treatment Detail/Skilled Intervention Session focused on safety with mobility. Pt IND with bed mobility. Pt declined SEC for in room ambulation, reports he uses it outside of home only. Pt completed sit>stand with supervision and subsequent sit>stands IND. Patient ambulated to/from bathroom pushing IV pole with supervision-IND, steady throughout. Pt needing cues for safety with lines as unaware, but moving well. Pt declined having to do  stairs, has an elevator at apartment. Pt encouraged to sit up in chair throughout day for increased upright activity. Pt sitting EOB at end of session, needs met.   OT Discharge Planning   OT Plan dc from inpatient OT   OT Discharge Recommendation (DC Rec) home with assist   OT Rationale for DC Rec Patient is mobilizing at baseline, supervision-IND in room. Pt completing ADLs at supervision-IND level as well. Pt reports most difficulty is with IADLs and requesting assistance with laundry, cooking, etc. Family can assist with these tasks, but they have their own families and lives and pt does not want to rely on them. Relayed to CC.   OT Brief overview of current status Supervision-IND with short distance ambulation and ADLs.   Total Session Time   Timed Code Treatment Minutes 34   Total Session Time (sum of timed and untimed services) 41

## 2023-06-07 NOTE — PLAN OF CARE
"  VS: /69 (BP Location: Right arm, Patient Position: Supine)   Pulse 70   Temp 97.3  F (36.3  C) (Oral)   Resp 17   Ht 1.7 m (5' 6.93\")   Wt 78.9 kg (174 lb)   SpO2 100%   BMI 27.31 kg/m       O2: Room air   Output: independent   Last BM:    Activity: Ad ailin   Skin: intact   Pain: Pre existing bilateral LE neuropathy   CMS: Intact except above   Dressing:    Diet: regular   LDA: L PIV   Equipment:    Plan: Pending results of swabs/testing   Additional Info: Patient is alert and oriented, Swiss speaking male. Cooperative. Daughter at bedside.       Goal Outcome Evaluation: Reviewed with patient and daughter                       "

## 2023-06-07 NOTE — PLAN OF CARE
Physical Therapy: Orders received. Chart reviewed and discussed with care team.? Physical Therapy not indicated due to pt is independent with mobility per OT.? Defer discharge recommendations to OT.? Will complete orders.

## 2023-06-07 NOTE — PLAN OF CARE
Occupational Therapy Discharge Summary    Reason for therapy discharge:    All goals and outcomes met, no further needs identified.    Progress towards therapy goal(s). See goals on Care Plan in [facility] electronic health record for goal details.  Goals met    Therapy recommendation(s):    Home with assist from family for IADLs such as laundry, cooking. Pt requesting PCA to assist with these things, relayed to CC.

## 2023-06-07 NOTE — PLAN OF CARE
Goal Outcome Evaluation:       Pt. admitted from ED at 2330 via bed and transferred to bed. Pt. accompanied by daughter and granddaughter and arrived with personal belongings. Pt speaks Venezuelan and needs interperter for all communication. Report was taken from Adilene GEORGE in ED.  Pt. is A&Ox4 and VS stable. N/t present in BLE baseline for pt. Pt. c/o 9/10 pain in shoulders, back, and neck bilaterally. Pt. denied nausea, CP, lightheadedness, or dizziness.  IV is SL & CDI.     Pt now on airborne precautions and is now being sent down to [facility]ortho to accommodate. Handoff completed over phone with DORIS Sun.

## 2023-06-07 NOTE — PROGRESS NOTES
[facility]    Medicine Progress Note - Hospitalist Service, GOLD TEAM 21    Date of Admission:  6/6/2023    Assessment & Plan   78 year old male with a past medical history of DM2, hypertension, and chronic low back pain who presented with diffuse weakness, myalgias to the Emergency Department seeking evaluation of body wide myalgias. Found to have possible pneumonia in the setting of recent travel to [facility].    CXR Patchy opacities at the left lower lobe may represent airspace disease or atelectasis. Pneumonia in this region is a possibility    Azithromycin, ceftiraxone given in the ED     # Community acquired pneumonia  CXR with infiltrate and + cough. Could possibly be contributing to weakness/myalgias noted.   - Ceftriaxone x 5 days   - azithromycin x 3 days   - low likelihood TB given no fever, night sweats, but given recent travel to [facility] with new onset cough out of abundance of caution will order TB precautions and TB gold.  - ID consult given above, appreciate recommendations     # Diffuse myalgias  Generalized subjective weakness  Failure to thrive  There seems to be a degree of radicular back pain that is chronic, but on top of this a more acute pain in the neck, shoulders, upper back, arms. On exam patient complains of tenderness throughout. No focal neuro deficits or true weakness. Possibly this is related to acute infectious process as above. However, given the relatively mild cough and lack of infectious symptoms consider other etiology especially in light of elevated inflammatory markers, differential includes PMR based on the distribution, spondyloarthropathy, polymyositis, among others , could be statin induced but seems he has been on this for a long time  Plan  - repeat inflammatory tomorrow after approx 48 hours treatment for infection, if symptoms and elevated inflammatory markers despite completing course of antibiotics could consider rheumatology  "referral   - add on ck level normal  - PT and OT evaluations    # Type II DM   - HOLD metformin for now  - cover with correctional insulin    # Hypertension   - continue losartan-hydrochlorothiazide (HYZAAR) 100-25 MG per tablet    # HLD  - hold statin for now due to myalgias     # BPH   - noted    DVT Prophylaxis:   - subcutaneous lovenox     Diet:   Regular diet   ______________________________________________________________________           Diet: Combination Diet Regular Diet Adult    DVT Prophylaxis: Enoxaparin (Lovenox) SQ  Rosales Catheter: Not present  Lines: None     Cardiac Monitoring: None  Code Status: Full Code      Clinically Significant Risk Factors Present on Admission                # Drug Induced Platelet Defect: home medication list includes an antiplatelet medication   # Hypertension: Home medication list includes antihypertensive(s)     # DMII: A1C = 7.5 % (Ref range: <5.7 %) within past 6 months    # Overweight: Estimated body mass index is 27.31 kg/m  as calculated from the following:    Height as of this encounter: 1.7 m (5' 6.93\").    Weight as of this encounter: 78.9 kg (174 lb).            Disposition Plan      Expected Discharge Date: 06/07/2023                  Tobias Samuels MD  Hospitalist Service, GOLD TEAM 21  [facility]  Securely message with [employer] (more info)  Text page via [employer] Paging/Directory   See signed in provider for up to date coverage information  ______________________________________________________________________    Interval History   No adverse overnight events. Reports feeling better, more strength, today than he did yesterday.         Physical Exam   Vital Signs: Temp: 97.3  F (36.3  C) Temp src: Oral BP: 129/69 Pulse: 70   Resp: 17 SpO2: 100 % O2 Device: None (Room air)    Weight: 174 lbs 0 oz    General Appearance: NAD, sitting up in bed, no oxygen requirement  Respiratory: CTAB, no signs of respiratory " distress  Cardiovascular: RRR no MRG noted  GI: Soft, NTND  Skin: WWP     Medical Decision Making     35 MINUTES SPENT BY ME on the date of service doing chart review, history, exam, documentation & further activities per the note.      Data     I have personally reviewed the following data over the past 24 hrs:    5.2  \   14.2   / 259     139 104 9.8 /  320 (H)   4.2 26 0.63 (L) \       ALT: 10 AST: 15 AP: 107 TBILI: 0.4   ALB: 4.1 TOT PROTEIN: 8.4 (H) LIPASE: 40       Trop: <6 BNP: 102       TSH: 2.96 T4: N/A A1C: 7.5 (H)       Procal: 0.03 CRP: 50.79 (H) Lactic Acid: 0.7         Imaging results reviewed over the past 24 hrs:   Recent Results (from the past 24 hour(s))   XR Chest 2 Views    Narrative    EXAM: XR CHEST 2 VIEWS  LOCATION: [facility]  DATE/TIME: 6/6/2023 6:39 PM CDT    INDICATION: Myalgias.  COMPARISON: None.      Impression    IMPRESSION: Patchy opacities at the left lower lobe may represent airspace disease or atelectasis. Pneumonia in this region is a possibility. Right lung appears clear. Normal cardiac silhouette.

## 2023-06-08 ENCOUNTER — APPOINTMENT (OUTPATIENT)
Dept: INTERPRETER SERVICES | Facility: CLINIC | Age: 78
DRG: 195 | End: 2023-06-08
Payer: COMMERCIAL

## 2023-06-08 LAB
ALBUMIN SERPL BCG-MCNC: 3.6 G/DL (ref 3.5–5.2)
ALP SERPL-CCNC: 94 U/L (ref 40–129)
ALT SERPL W P-5'-P-CCNC: 9 U/L (ref 10–50)
ANION GAP SERPL CALCULATED.3IONS-SCNC: 13 MMOL/L (ref 7–15)
AST SERPL W P-5'-P-CCNC: 10 U/L (ref 10–50)
BILIRUB SERPL-MCNC: 0.3 MG/DL
BUN SERPL-MCNC: 7.7 MG/DL (ref 8–23)
CALCIUM SERPL-MCNC: 9.2 MG/DL (ref 8.8–10.2)
CHLORIDE SERPL-SCNC: 101 MMOL/L (ref 98–107)
CREAT SERPL-MCNC: 0.61 MG/DL (ref 0.67–1.17)
CRP SERPL-MCNC: 42.4 MG/L
DEPRECATED HCO3 PLAS-SCNC: 24 MMOL/L (ref 22–29)
ERYTHROCYTE [DISTWIDTH] IN BLOOD BY AUTOMATED COUNT: 12.3 % (ref 10–15)
GAMMA INTERFERON BACKGROUND BLD IA-ACNC: 0.03 IU/ML
GFR SERPL CREATININE-BSD FRML MDRD: >90 ML/MIN/1.73M2
GLUCOSE BLDC GLUCOMTR-MCNC: 120 MG/DL (ref 70–99)
GLUCOSE BLDC GLUCOMTR-MCNC: 193 MG/DL (ref 70–99)
GLUCOSE BLDC GLUCOMTR-MCNC: 213 MG/DL (ref 70–99)
GLUCOSE BLDC GLUCOMTR-MCNC: 219 MG/DL (ref 70–99)
GLUCOSE BLDC GLUCOMTR-MCNC: 247 MG/DL (ref 70–99)
GLUCOSE SERPL-MCNC: 194 MG/DL (ref 70–99)
HCT VFR BLD AUTO: 41 % (ref 40–53)
HGB BLD-MCNC: 14.5 G/DL (ref 13.3–17.7)
M TB IFN-G BLD-IMP: NEGATIVE
M TB IFN-G CD4+ BCKGRND COR BLD-ACNC: 4.41 IU/ML
MCH RBC QN AUTO: 31.1 PG (ref 26.5–33)
MCHC RBC AUTO-ENTMCNC: 35.4 G/DL (ref 31.5–36.5)
MCV RBC AUTO: 88 FL (ref 78–100)
MITOGEN IGNF BCKGRD COR BLD-ACNC: 0.09 IU/ML
MITOGEN IGNF BCKGRD COR BLD-ACNC: 0.12 IU/ML
PLATELET # BLD AUTO: 294 10E3/UL (ref 150–450)
POTASSIUM SERPL-SCNC: 4 MMOL/L (ref 3.4–5.3)
PROT SERPL-MCNC: 7.5 G/DL (ref 6.4–8.3)
QUANTIFERON MITOGEN: 4.44 IU/ML
QUANTIFERON NIL TUBE: 0.03 IU/ML
QUANTIFERON TB1 TUBE: 0.15 IU/ML
QUANTIFERON TB2 TUBE: 0.12
RBC # BLD AUTO: 4.66 10E6/UL (ref 4.4–5.9)
SODIUM SERPL-SCNC: 138 MMOL/L (ref 136–145)
WBC # BLD AUTO: 4.9 10E3/UL (ref 4–11)

## 2023-06-08 PROCEDURE — 250N000013 HC RX MED GY IP 250 OP 250 PS 637: Performed by: PHYSICIAN ASSISTANT

## 2023-06-08 PROCEDURE — 87116 MYCOBACTERIA CULTURE: CPT | Performed by: INTERNAL MEDICINE

## 2023-06-08 PROCEDURE — 85027 COMPLETE CBC AUTOMATED: CPT | Performed by: STUDENT IN AN ORGANIZED HEALTH CARE EDUCATION/TRAINING PROGRAM

## 2023-06-08 PROCEDURE — 80053 COMPREHEN METABOLIC PANEL: CPT | Performed by: STUDENT IN AN ORGANIZED HEALTH CARE EDUCATION/TRAINING PROGRAM

## 2023-06-08 PROCEDURE — 87206 SMEAR FLUORESCENT/ACID STAI: CPT | Performed by: STUDENT IN AN ORGANIZED HEALTH CARE EDUCATION/TRAINING PROGRAM

## 2023-06-08 PROCEDURE — 87116 MYCOBACTERIA CULTURE: CPT | Performed by: STUDENT IN AN ORGANIZED HEALTH CARE EDUCATION/TRAINING PROGRAM

## 2023-06-08 PROCEDURE — 36415 COLL VENOUS BLD VENIPUNCTURE: CPT | Performed by: STUDENT IN AN ORGANIZED HEALTH CARE EDUCATION/TRAINING PROGRAM

## 2023-06-08 PROCEDURE — 250N000011 HC RX IP 250 OP 636: Performed by: PHYSICIAN ASSISTANT

## 2023-06-08 PROCEDURE — 250N000013 HC RX MED GY IP 250 OP 250 PS 637: Performed by: STUDENT IN AN ORGANIZED HEALTH CARE EDUCATION/TRAINING PROGRAM

## 2023-06-08 PROCEDURE — 120N000002 HC R&B MED SURG/OB UMMC

## 2023-06-08 PROCEDURE — 87206 SMEAR FLUORESCENT/ACID STAI: CPT | Performed by: INTERNAL MEDICINE

## 2023-06-08 PROCEDURE — 99233 SBSQ HOSP IP/OBS HIGH 50: CPT | Performed by: STUDENT IN AN ORGANIZED HEALTH CARE EDUCATION/TRAINING PROGRAM

## 2023-06-08 PROCEDURE — 86140 C-REACTIVE PROTEIN: CPT | Performed by: STUDENT IN AN ORGANIZED HEALTH CARE EDUCATION/TRAINING PROGRAM

## 2023-06-08 RX ORDER — METHOCARBAMOL 500 MG/1
500 TABLET, FILM COATED ORAL 4 TIMES DAILY PRN
Status: DISCONTINUED | OUTPATIENT
Start: 2023-06-08 | End: 2023-06-09 | Stop reason: HOSPADM

## 2023-06-08 RX ADMIN — INSULIN ASPART 1 UNITS: 100 INJECTION, SOLUTION INTRAVENOUS; SUBCUTANEOUS at 13:03

## 2023-06-08 RX ADMIN — INSULIN ASPART 1 UNITS: 100 INJECTION, SOLUTION INTRAVENOUS; SUBCUTANEOUS at 22:28

## 2023-06-08 RX ADMIN — ASPIRIN 81 MG CHEWABLE TABLET 81 MG: 81 TABLET CHEWABLE at 08:31

## 2023-06-08 RX ADMIN — INSULIN ASPART 1 UNITS: 100 INJECTION, SOLUTION INTRAVENOUS; SUBCUTANEOUS at 08:32

## 2023-06-08 RX ADMIN — LOSARTAN POTASSIUM 100 MG: 100 TABLET, FILM COATED ORAL at 08:32

## 2023-06-08 RX ADMIN — ENOXAPARIN SODIUM 40 MG: 40 INJECTION SUBCUTANEOUS at 08:33

## 2023-06-08 RX ADMIN — Medication: at 13:03

## 2023-06-08 RX ADMIN — CEFTRIAXONE SODIUM 1 G: 1 INJECTION, POWDER, FOR SOLUTION INTRAMUSCULAR; INTRAVENOUS at 20:52

## 2023-06-08 RX ADMIN — AMLODIPINE BESYLATE 10 MG: 10 TABLET ORAL at 08:32

## 2023-06-08 RX ADMIN — TAMSULOSIN HYDROCHLORIDE 0.4 MG: 0.4 CAPSULE ORAL at 08:32

## 2023-06-08 RX ADMIN — PROPRANOLOL HYDROCHLORIDE 60 MG: 60 CAPSULE, EXTENDED RELEASE ORAL at 08:32

## 2023-06-08 RX ADMIN — PANTOPRAZOLE SODIUM 40 MG: 40 TABLET, DELAYED RELEASE ORAL at 08:32

## 2023-06-08 RX ADMIN — Medication: at 20:55

## 2023-06-08 RX ADMIN — AZITHROMYCIN 500 MG: 500 TABLET, FILM COATED ORAL at 08:32

## 2023-06-08 ASSESSMENT — ACTIVITIES OF DAILY LIVING (ADL)
ADLS_ACUITY_SCORE: 26

## 2023-06-08 NOTE — PROGRESS NOTES
[facility]    Medicine Progress Note - Hospitalist Service, GOLD TEAM 21    Date of Admission:  6/6/2023    Assessment & Plan   78 year old male with a past medical history of DM2, hypertension, and chronic low back pain who presented with diffuse weakness, myalgias. Found to have possible pneumonia in the setting of recent travel to [facility].    # Community acquired pneumonia  # Rule out active Tuberculosis  CXR with infiltrate and + cough. Could possibly be contributing to weakness/myalgias noted. This most likely represents a community acquired pneumonia. Quantiferon gold negative suggesting unlikely latent tuberculosis prior to travel. Unable to produce adequate sputum at this time for AFB and MTB testing. Remains with normal saturations in room air.  - Ceftriaxone (6/6 - ) and azithromycin (6/6 - )  - Attempt to obtain sputum for AFB, MTB testing if patient is able to produce.  - Continue airborne precautions for now.    # Diffuse myalgias  # Generalized subjective weakness  There seems to be a degree of radicular back pain that is chronic, but on top of this a more acute pain in the neck, shoulders, upper back, arms. On exam patient complains of tenderness throughout. No focal neuro deficits or true weakness. Possibly this is related to acute infectious process as above. However, given the relatively mild cough and lack of infectious symptoms consider other etiology especially in light of elevated inflammatory markers, differential includes PMR based on the distribution, spondyloarthropathy, polymyositis, among others , could be statin induced but seems he has been on this for a long time. Inflammatory markers downtrending.   Plan:  - Plain films cervical spine  - Icy/hot rub for shoulders and neck muscles, hot packs  - Repeat inflammatory markers tomorrow, repeat CK     # Type II DM   - HOLD metformin for now  - cover with correctional insulin    # Hypertension   -  "continue losartan-hydrochlorothiazide (HYZAAR) 100-25 MG per tablet    # HLD  - hold statin for now due to myalgias     # BPH   - noted    DVT Prophylaxis:   - subcutaneous lovenox     Diet:   Regular diet      Diet: Combination Diet Regular Diet Adult    DVT Prophylaxis: Enoxaparin (Lovenox) SQ  Rosales Catheter: Not present  Lines: None     Cardiac Monitoring: None  Code Status: Full Code      Clinically Significant Risk Factors                        # DMII: A1C = 7.5 % (Ref range: <5.7 %) within past 6 months, PRESENT ON ADMISSION  # Overweight: Estimated body mass index is 27.31 kg/m  as calculated from the following:    Height as of this encounter: 1.7 m (5' 6.93\").    Weight as of this encounter: 78.9 kg (174 lb)., PRESENT ON ADMISSION          Disposition Plan      Expected Discharge Date: 06/09/2023                  Tobias Samuels MD  Hospitalist Service, GOLD TEAM 21  [facility]  Securely message with [facility] (more info)  Text page via [facility] Paging/Directory   See signed in provider for up to date coverage information  ______________________________________________________________________    Interval History   Reports persistent pain in the neck/shoulders and diffuse pain. Weakness is overall better. Very thankful for the care that he has been receiving. Mentions some pain with trying to look to sides, occurring in the neck.    Physical Exam   Vital Signs: Temp: 98.5  F (36.9  C) Temp src: Oral BP: 135/77 Pulse: 90   Resp: 16 SpO2: 95 % O2 Device: None (Room air)    Weight: 174 lbs 0 oz     General Appearance:  NAD, sitting up in bed, no oxygen requirement  Respiratory: CTAB, no signs of respiratory distress  Cardiovascular: RRR no MRG noted  GI: Soft, NTND  Skin: WWP    Medical Decision Making     35 MINUTES SPENT BY ME on the date of service doing chart review, history, exam, documentation & further activities per the note.      Data     I have personally " reviewed the following data over the past 24 hrs:    4.9  \   14.5   / 294     138 101 7.7 (L) /  213 (H)   4.0 24 0.61 (L) \       ALT: 9 (L) AST: 10 AP: 94 TBILI: 0.3   ALB: 3.6 TOT PROTEIN: 7.5 LIPASE: N/A       Procal: N/A CRP: 42.40 (H) Lactic Acid: N/A         Imaging results reviewed over the past 24 hrs:   No results found for this or any previous visit (from the past 24 hour(s)).

## 2023-06-08 NOTE — DISCHARGE INSTRUCTIONS
[facility] Care Coordination Services  Your care coordinator can arrange for services to help you stay in your home, help you get different levels of care and more.To find out who your Care Coordinator is call [phone] or [phone].     Senior LinkAge Line   [phone]     The Senior LinkAge Line provides help to older [place] and their families and caregivers, helping them connect to local services, find answers and get the help they need. The Senior LinkAge Line can help [place] with many age-related issues and can answer questions and help with:      Health insurance counseling - including Medicare, long-term care planning and prescription drug costs    Forms assistance, including help applying for Medical Assistance and Medicare Extra Help    Long-term care insurance and planning    Comparing housing options    Connecting with help and services in the community    Moving out of a nursing home back into the community    Pre-admission screening for nursing facility admissions   And, much more.

## 2023-06-08 NOTE — PLAN OF CARE
"VS: BP (!) 140/80 (BP Location: Left arm)   Pulse 75   Temp 98.5  F (36.9  C) (Oral)   Resp 16   Ht 1.7 m (5' 6.93\")   Wt 78.9 kg (174 lb)   SpO2 96%   BMI 27.31 kg/m     O2: >90% on room air. Sputum sample needed, however patient not coughing up phlegm    Output: Voiding spontaneously without difficulties    Last BM: 06/05/23   Activity: Up ad ailin in room with cane    Skin: Intact, refused full skin assessment    Pain: C/o generalize body ache, managed with warm packs and Bengay ointment    CMS: Alert and oriented x3.    Dressing: NA   Diet: Regular diet    LDA: PIV saline locked    Equipment: Cane, personal belongings, call light within patient reach    Plan: Repeat inflammatory markers tomorrow, repeat CK    Additional Info:  Quantiferon gold test negative. Continue airborne precautions       "

## 2023-06-08 NOTE — PHARMACY-ADMISSION MEDICATION HISTORY
Pharmacist Admission Medication History    Admission medication history is complete. The information provided in this note is only as accurate as the sources available at the time of the update.    Medication reconciliation/reorder completed by provider prior to medication history? Yes    Information Source(s): Family member and CareEverywhere/SureScripts via phone. Called his daughter, Patricia, [phone]. She said she was unsure of the exact medications he was on as the bottles were at his apartment and she was not there. She said he fills at [facility] (can see fill hx in Sure Scripts). She said he last took his medications yesterday morning. [facility] is closed, so unable to double check the fill history I can see with them at this time.    Pertinent Information: Finished up a course of atovaquone-proguanil recently from his [place] trip. Unsure if he is still taking the aspirin on his PTA med list as it can be bought OTC.     Changes made to PTA medication list:    Added: propranolol, amlodipine, clobetasol oint prn, ketoconazole shampoo prn, omeprazole (per fill hx), vitamin D dose info, Miralax dose info, losartan (all recent fills in past 6 months)    Deleted: docusate, Flonase, Genteal eye drops, losartan-hydrochlorothiazide, Tylenol PRN, ranitidine, simvastatin (all were entries that were from a couple to 5+ years ago, no fills for them in the past year)    Changed: atorvastatin from 80mg to 20mg daily (per fill hx); metformin from 500mg XR daily to 1000mg BID (per fill hx), added    Medication Affordability: Did not ask       Allergies reviewed with patient and updates made in EHR: unable to assess    Medication History Completed By: Gisel Garcia RPH 6/7/2023 8:32 PM    Prior to Admission medications    Medication Sig Last Dose Taking? Auth Provider Long Term End Date   amLODIPine (NORVASC) 10 MG tablet Take 10 mg by mouth daily 6/6/2023 at am Yes Unknown, Entered By History Yes     atorvastatin (LIPITOR) 20 MG tablet Take 20 mg by mouth daily 6/6/2023 at am Yes Unknown, Entered By History No    clobetasol (TEMOVATE) 0.05 % external ointment Apply topically 2 times daily as needed prn at prn Yes Unknown, Entered By History     ketoconazole (NIZORAL) 2 % external shampoo Apply topically daily as needed for itching or irritation prn at prn Yes Unknown, Entered By History     losartan (COZAAR) 100 MG tablet Take 100 mg by mouth daily 6/6/2023 at am Yes Unknown, Entered By History     metFORMIN (GLUCOPHAGE) 1000 MG tablet Take 1,000 mg by mouth 2 times daily (with meals) 6/6/2023 at ama Yes Unknown, Entered By History No    omeprazole (PRILOSEC) 20 MG DR capsule Take 20 mg by mouth daily 6/6/2023 at am Yes Unknown, Entered By History     polyethylene glycol (MIRALAX) 17 g packet Take 17 g by mouth daily as needed for constipation prn at prn Yes Unknown, Entered By History     propranolol ER (INDERAL LA) 60 MG 24 hr capsule Take 60 mg by mouth daily 6/6/2023 at am Yes Unknown, Entered By History Yes    tamsulosin (FLOMAX) 0.4 MG capsule Take 0.4 mg by mouth daily Past Week Yes Unknown, Entered By History     Vitamin D3 (CHOLECALCIFEROL) 25 mcg (1000 units) tablet Take 25 mcg by mouth daily 6/6/2023 Yes Unknown, Entered By History     aspirin 81 MG EC tablet Take 81 mg by mouth daily unsure if still taking at unsure if taking  Unknown, Entered By History     blood glucose monitoring (PRISCILLA CONTOUR NEXT) test strip Test  daily.   Monica Tenorio MD     blood glucose monitoring (PRISCILLA MICROLET) lancets Use to test daily   Monica Tenorio MD

## 2023-06-08 NOTE — PROGRESS NOTES
Care Management Follow Up    Length of Stay (days): 2    Expected Discharge Date: 06/09/2023     Concerns to be Addressed:   Community resources    Patient plan of care discussed at interdisciplinary rounds: Yes    Anticipated Discharge Disposition:  Home with family     Anticipated Discharge Services:    [facility] Care Coordination   Ph: [phone] or [phone]    [facility] Link[facility] Line  Ph: [phone]   Anticipated Discharge DME:  TBD    Patient/family educated on Medicare website which has current facility and service quality ratings:  N/A  Education Provided on the Discharge Plan:    Patient/Family in Agreement with the Plan:      Referrals Placed by CM/SW:    Private pay costs discussed: Not applicable    Additional Information:  SW notified that pt daughter had questions about getting pt extra support/services at home. SW attempted to call pt's daughter to discuss and provide resources but there was no answer. SW left  requesting a call back when able. Pt daughter already visited today per Bedside RN, but Bedside RN will update SW if they come this afternoon for SW to touch base.     SHASHANK put resources/information for [facility] Care Coordination and [facility] Linkage Line in pt's discharge instructions.     Neyda Esquivel, MIKE  [facility]   [facility]   5Ortho  Coverage  5O SW Ph: [phone]

## 2023-06-08 NOTE — PLAN OF CARE
"  VS: BP (!) 141/82 (BP Location: Right arm, Patient Position: Semi-Capps's)   Pulse 78   Temp 98.5  F (36.9  C) (Oral)   Resp 16   Ht 1.7 m (5' 6.93\")   Wt 78.9 kg (174 lb)   SpO2 95%   BMI 27.31 kg/m      O2: stable on room air. Denies cough.    Output: Voids to the bathroom independently     Last BM: 6/5   Activity: Independent with a cane   Skin: visible skin intact   Pain: Tylenol given for chest pain related to pneumonia    CMS: AXO x4  denies N/T   Dressing: N/A   Diet: Reg diet.   BG 10PM 272. INSULIN    LDA: Right piv sl   Equipment: Pt belongings   Plan: Count with POC   Additional Info: ENTERIC precaution to rule out TB.TB Test  pending                          "

## 2023-06-08 NOTE — PLAN OF CARE
"  VS: /77 (BP Location: Left arm)   Pulse 90   Temp 98.5  F (36.9  C) (Oral)   Resp 16   Ht 1.7 m (5' 6.93\")   Wt 78.9 kg (174 lb)   SpO2 95%   BMI 27.31 kg/m       O2: Room air   Output: independent   Last BM: 6/6/2023   Activity: Up ad ailin   Skin: intact   Pain: Continues to have body aches throughout.    CMS: LE neuropathy baseline   Dressing: NA   Diet: Regular, patient encouraged to eat meals. Reluctant due to blood glucose levels   LDA: R PIV saline locked   Equipment: cane   Plan: Await test results, continue pain management and comfort   Additional Info: Patient is alert and cooperative.  used, patient is Nauruan speaking     Goal Outcome Evaluation: patient continues to have difficulty with generalized body pain. Will continue to address and intervene with medications and non medication modalities.                        "

## 2023-06-08 NOTE — PROGRESS NOTES
Brief Cross Cover Note 6:55 AM:     RN reporting patient complaining of body aches and refusing prn tylenol order as reporting this doesn't work. Ordered prn muscle relaxant to see if this helps.     Bisi Aguilar MD

## 2023-06-08 NOTE — PLAN OF CARE
"VS: /78 (BP Location: Right arm)   Pulse 70   Temp 98.5  F (36.9  C) (Oral)   Resp 16   Ht 1.7 m (5' 6.93\")   Wt 78.9 kg (174 lb)   SpO2 95%   BMI 27.31 kg/m      O2: O2 SATS >90% denies chest pain,  or SBO   Output: Voids adequately in the bathroom    Last BM: 6/5/23 BS present all x4 quadrants    Activity: Up ad ailin , independent in the room    Up for meals? N/A    Skin: Declined full skin assessment, visible sin is clean dry and intact   Pain: C/o of chronic body pain , and aches cross cover paged  New order for muscle relaxant to be given to see it   CMS: AOX3 Denies numbness and tingling   Dressing: None    Diet: Regular  diet  diabetic  with BG checks and sliding scale    LDA: L PIV saline locked    Equipment: IV Pole Gait Belt. Walker    Plan: TBD Continue to monitor  and update,    Additional Info:                              "

## 2023-06-09 ENCOUNTER — APPOINTMENT (OUTPATIENT)
Dept: GENERAL RADIOLOGY | Facility: CLINIC | Age: 78
DRG: 195 | End: 2023-06-09
Attending: STUDENT IN AN ORGANIZED HEALTH CARE EDUCATION/TRAINING PROGRAM
Payer: COMMERCIAL

## 2023-06-09 VITALS
HEIGHT: 67 IN | RESPIRATION RATE: 16 BRPM | OXYGEN SATURATION: 99 % | TEMPERATURE: 97.5 F | DIASTOLIC BLOOD PRESSURE: 83 MMHG | HEART RATE: 74 BPM | SYSTOLIC BLOOD PRESSURE: 130 MMHG | BODY MASS INDEX: 27.31 KG/M2 | WEIGHT: 174 LBS

## 2023-06-09 LAB
ANION GAP SERPL CALCULATED.3IONS-SCNC: 10 MMOL/L (ref 7–15)
BUN SERPL-MCNC: 12.4 MG/DL (ref 8–23)
CALCIUM SERPL-MCNC: 9.3 MG/DL (ref 8.8–10.2)
CHLORIDE SERPL-SCNC: 100 MMOL/L (ref 98–107)
CK SERPL-CCNC: 22 U/L (ref 39–308)
CREAT SERPL-MCNC: 0.69 MG/DL (ref 0.67–1.17)
CRP SERPL-MCNC: 37.4 MG/L
DEPRECATED HCO3 PLAS-SCNC: 24 MMOL/L (ref 22–29)
ERYTHROCYTE [DISTWIDTH] IN BLOOD BY AUTOMATED COUNT: 12.4 % (ref 10–15)
GFR SERPL CREATININE-BSD FRML MDRD: >90 ML/MIN/1.73M2
GLUCOSE BLDC GLUCOMTR-MCNC: 157 MG/DL (ref 70–99)
GLUCOSE BLDC GLUCOMTR-MCNC: 179 MG/DL (ref 70–99)
GLUCOSE SERPL-MCNC: 334 MG/DL (ref 70–99)
HCT VFR BLD AUTO: 41.5 % (ref 40–53)
HGB BLD-MCNC: 14.7 G/DL (ref 13.3–17.7)
MCH RBC QN AUTO: 31.5 PG (ref 26.5–33)
MCHC RBC AUTO-ENTMCNC: 35.4 G/DL (ref 31.5–36.5)
MCV RBC AUTO: 89 FL (ref 78–100)
PLATELET # BLD AUTO: 317 10E3/UL (ref 150–450)
POTASSIUM SERPL-SCNC: 4.2 MMOL/L (ref 3.4–5.3)
RBC # BLD AUTO: 4.67 10E6/UL (ref 4.4–5.9)
SODIUM SERPL-SCNC: 134 MMOL/L (ref 136–145)
WBC # BLD AUTO: 5.7 10E3/UL (ref 4–11)

## 2023-06-09 PROCEDURE — 36415 COLL VENOUS BLD VENIPUNCTURE: CPT | Performed by: STUDENT IN AN ORGANIZED HEALTH CARE EDUCATION/TRAINING PROGRAM

## 2023-06-09 PROCEDURE — 250N000013 HC RX MED GY IP 250 OP 250 PS 637: Performed by: PHYSICIAN ASSISTANT

## 2023-06-09 PROCEDURE — 82310 ASSAY OF CALCIUM: CPT | Performed by: STUDENT IN AN ORGANIZED HEALTH CARE EDUCATION/TRAINING PROGRAM

## 2023-06-09 PROCEDURE — 85027 COMPLETE CBC AUTOMATED: CPT | Performed by: STUDENT IN AN ORGANIZED HEALTH CARE EDUCATION/TRAINING PROGRAM

## 2023-06-09 PROCEDURE — 82550 ASSAY OF CK (CPK): CPT | Performed by: STUDENT IN AN ORGANIZED HEALTH CARE EDUCATION/TRAINING PROGRAM

## 2023-06-09 PROCEDURE — 250N000011 HC RX IP 250 OP 636: Performed by: PHYSICIAN ASSISTANT

## 2023-06-09 PROCEDURE — 72040 X-RAY EXAM NECK SPINE 2-3 VW: CPT

## 2023-06-09 PROCEDURE — 72040 X-RAY EXAM NECK SPINE 2-3 VW: CPT | Mod: 26 | Performed by: RADIOLOGY

## 2023-06-09 PROCEDURE — 99239 HOSP IP/OBS DSCHRG MGMT >30: CPT | Performed by: STUDENT IN AN ORGANIZED HEALTH CARE EDUCATION/TRAINING PROGRAM

## 2023-06-09 PROCEDURE — 86140 C-REACTIVE PROTEIN: CPT | Performed by: STUDENT IN AN ORGANIZED HEALTH CARE EDUCATION/TRAINING PROGRAM

## 2023-06-09 RX ORDER — ACETAMINOPHEN 325 MG/1
650 TABLET ORAL EVERY 6 HOURS PRN
Qty: 60 TABLET | Refills: 0 | Status: SHIPPED | OUTPATIENT
Start: 2023-06-09

## 2023-06-09 RX ORDER — CEFDINIR 300 MG/1
300 CAPSULE ORAL 2 TIMES DAILY
Qty: 4 CAPSULE | Refills: 0 | Status: SHIPPED | OUTPATIENT
Start: 2023-06-09 | End: 2023-06-11

## 2023-06-09 RX ADMIN — ASPIRIN 81 MG CHEWABLE TABLET 81 MG: 81 TABLET CHEWABLE at 08:42

## 2023-06-09 RX ADMIN — INSULIN ASPART 1 UNITS: 100 INJECTION, SOLUTION INTRAVENOUS; SUBCUTANEOUS at 08:43

## 2023-06-09 RX ADMIN — ENOXAPARIN SODIUM 40 MG: 40 INJECTION SUBCUTANEOUS at 08:42

## 2023-06-09 RX ADMIN — PANTOPRAZOLE SODIUM 40 MG: 40 TABLET, DELAYED RELEASE ORAL at 08:42

## 2023-06-09 RX ADMIN — LOSARTAN POTASSIUM 100 MG: 100 TABLET, FILM COATED ORAL at 08:42

## 2023-06-09 RX ADMIN — AZITHROMYCIN 500 MG: 500 TABLET, FILM COATED ORAL at 08:42

## 2023-06-09 RX ADMIN — TAMSULOSIN HYDROCHLORIDE 0.4 MG: 0.4 CAPSULE ORAL at 08:42

## 2023-06-09 RX ADMIN — AMLODIPINE BESYLATE 10 MG: 10 TABLET ORAL at 08:42

## 2023-06-09 RX ADMIN — PROPRANOLOL HYDROCHLORIDE 60 MG: 60 CAPSULE, EXTENDED RELEASE ORAL at 08:42

## 2023-06-09 ASSESSMENT — ACTIVITIES OF DAILY LIVING (ADL)
ADLS_ACUITY_SCORE: 26

## 2023-06-09 NOTE — PROGRESS NOTES
"  VS: /83 (BP Location: Left arm)   Pulse 74   Temp 97.5  F (36.4  C) (Oral)   Resp 16   Ht 1.7 m (5' 6.93\")   Wt 78.9 kg (174 lb)   SpO2 99%   BMI 27.31 kg/m     O2: RA   Output: Voids in toilet   Last BM: 6/6   Activity: indep   Skin: intact   Pain: denies   CMS: intact   Dressing: none   Diet: reg   LDA: PIV in LAC   Equipment: none   Plan: Discharge home 6/9   Additional Info:      Kong left the unit by wheelchair.  His family picked him up .  An  was used for discharge instructions.  He received his prescriptions.  All questions & concerns were answered. This RN also called his daughter & explained his prescriptions to her.  She is driving to the hospital to pick him up.    "

## 2023-06-09 NOTE — PLAN OF CARE
"  VS: /75 (BP Location: Left arm)   Pulse 86   Temp 97.8  F (36.6  C) (Oral)   Resp 16   Ht 1.7 m (5' 6.93\")   Wt 78.9 kg (174 lb)   SpO2 95%   BMI 27.31 kg/m       O2: stable on room air>90%. Denies SOB or cough    Output: Voids to the bathroom independently     Last BM: 6/5   Activity: Independent with a cane   Skin: visible skin intact   Pain: Body aches. Bengay applied to neck and shoulder   CMS: AXO x4  denies N/T   Dressing: N/A   Diet: Reg diet.   BG 10PM-219, 2AM-179    LDA: Right piv SL   Equipment: Pt belongings   Plan: Count with POC   Additional Info: ENTERIC precaution maintained.TB test negative. Sputum culture collected .                          "

## 2023-06-09 NOTE — DISCHARGE SUMMARY
"[facility]  Hospitalist Discharge Summary      Date of Admission:  6/6/2023  Date of Discharge:  6/9/2023  Discharging Provider: Tobias Samuels MD  Discharge Service: Hospitalist Service, GOLD TEAM 21    Discharge Diagnoses   Left lower lobe community acquired pneumonia  Diffuse myalgia  Generalized subjective weakness  DM2 (POA)  Hypertension (POA)  HLD (POA)    Clinically Significant Risk Factors     # DMII: A1C = 7.5 % (Ref range: <5.7 %) within past 6 months  # Overweight: Estimated body mass index is 27.31 kg/m  as calculated from the following:    Height as of this encounter: 1.7 m (5' 6.93\").    Weight as of this encounter: 78.9 kg (174 lb).       Follow-ups Needed After Discharge   Follow-up Appointments     Follow Up and recommended labs and tests      Follow up with primary care provider within 7 days for hospital follow-   up.  No follow up labs or test are needed.    If not improving after treatment for pneumonia, may need to consider   sputum testing for active TB. He was not producing sputum during admission   and overall deemed low risk for this condition, as well as Quantiferon   Gold negative. However, did have recent travel to [facility] and presented with   a mild cough.             Unresulted Labs Ordered in the Past 30 Days of this Admission     No orders found from 5/7/2023 to 6/7/2023.      These results will be followed up by N/A    Discharge Disposition   Discharged to home  Condition at discharge: Stable    Hospital Course   78 year old male with a past medical history of DM2, hypertension, and chronic low back pain who presented with diffuse weakness, myalgias. Found to have possible pneumonia in the setting of recent travel to [facility].    # Community acquired pneumonia  CXR with infiltrate and + cough. Thought possibly be contributing to weakness/myalgias noted on presentation. Combination of signs/symptoms and testing most suggestive of a " community acquired pneumonia. Quantiferon gold negative suggesting unlikely latent tuberculosis prior to travel. Unable to produce adequate sputum at this time for AFB and MTB testing, though perceived risk of tuberculosis is overall low. Remains with normal saturations in room air.  - Ceftriaxone (6/6 - 6/8) and azithromycin (6/6 - 6/8), discharged to complete 5 days of 3rd gen cephalosporin with cefdinir.  - Can consider outpatient TB workup if no improvement despite antibiotics, though risk of this seems quite low.    # Diffuse myalgias  # Generalized subjective weakness  There seems to be a degree of radicular back pain that is chronic, but on top of this a more acute pain in the neck, shoulders, upper back, arms. On exam patient complains of tenderness throughout. No focal neuro deficits or true weakness. Possibly this is related to acute infectious process as above. However, given the relatively mild cough and lack of infectious symptoms consider other etiology especially in light of elevated inflammatory markers, differential includes PMR based on the distribution, spondyloarthropathy, polymyositis, among others , could be statin induced but seems he has been on this for a long time. Inflammatory markers downtrending and CK normal on two separate checks. Plain films of cervical spine with multilevel spondylosis, but otherwise no concerning findings.  Plan:  - Icy/hot rub for shoulders and neck muscles, hot packs  - Tylenol PRN    # Type II DM   - Restarted metformin on discharge, used correction insulin while inpatient.    # Hypertension   - continued losartan-hydrochlorothiazide (HYZAAR) 100-25 MG per tablet    # HLD  - held statin for now due to myalgias; restarted on discharge       Consultations This Hospital Stay   PHYSICAL THERAPY ADULT IP CONSULT  OCCUPATIONAL THERAPY ADULT IP CONSULT  INFECTIOUS DISEASE [facility] ADULT IP CONSULT    Code Status   Full Code    Time Spent on this Encounter   Tobias LOMELI  NEGAR Samuels MD, personally saw the patient today and spent greater than 30 minutes discharging this patient.       Tobias Samuels MD  [facility] MED SURG ORTHOPEDIC  [address] [address]  Phone: [phone]  Fax: [phone]  ______________________________________________________________________    Physical Exam   Vital Signs: Temp: 97.5  F (36.4  C) Temp src: Oral BP: 130/83 Pulse: 74   Resp: 16 SpO2: 99 % O2 Device: None (Room air)    Weight: 174 lbs 0 oz  General Appearance:  NAD, sitting up in bed, no oxygen requirement  Respiratory: CTAB, no signs of respiratory distress  Cardiovascular: RRR no MRG noted  GI: Soft, NTND  Skin: WWP       Primary Care Physician   Provider Not In System    Discharge Orders      Reason for your hospital stay    Mr. Kong Haskins was admitted to the hospital with concern for a pneumonia and for diffuse body aches.     Activity    Your activity upon discharge: activity as tolerated     Follow Up and recommended labs and tests    Follow up with primary care provider within 7 days for hospital follow- up.  No follow up labs or test are needed.    If not improving after treatment for pneumonia, may need to consider sputum testing for active TB. He was not producing sputum during admission and overall deemed low risk for this condition, as well as Quantiferon Gold negative. However, did have recent travel to [facility] and presented with a mild cough.     Diet    Follow this diet upon discharge: Orders Placed This Encounter      Combination Diet Regular Diet Adult       Significant Results and Procedures   Most Recent 3 CBC's:Recent Labs   Lab Test 06/09/23  1048 06/08/23  0759 06/07/23  0657   WBC 5.7 4.9 5.2   HGB 14.7 14.5 14.2   MCV 89 88 89    294 259     Most Recent 3 BMP's:Recent Labs   Lab Test 06/09/23  1048 06/09/23  0828 06/09/23  0148 06/08/23  0822 06/08/23  0759 06/07/23  0829 06/07/23  0657   *  --   --   --  138  --   139   POTASSIUM 4.2  --   --   --  4.0  --  4.2   CHLORIDE 100  --   --   --  101  --  104   CO2 24  --   --   --  24  --  26   BUN 12.4  --   --   --  7.7*  --  9.8   CR 0.69  --   --   --  0.61*  --  0.63*   ANIONGAP 10  --   --   --  13  --  9   LAURIE 9.3  --   --   --  9.2  --  9.1   * 157* 179*   < > 194*   < > 166*    < > = values in this interval not displayed.     Most Recent 2 LFT's:Recent Labs   Lab Test 06/08/23  0759 06/06/23  1745   AST 10 15   ALT 9* 10   ALKPHOS 94 107   BILITOTAL 0.3 0.4     Most Recent 3 INR's:No lab results found.,   Results for orders placed or performed during the hospital encounter of 06/06/23   XR Chest 2 Views    Narrative    EXAM: XR CHEST 2 VIEWS  LOCATION: [facility]  DATE/TIME: 6/6/2023 6:39 PM CDT    INDICATION: Myalgias.  COMPARISON: None.      Impression    IMPRESSION: Patchy opacities at the left lower lobe may represent airspace disease or atelectasis. Pneumonia in this region is a possibility. Right lung appears clear. Normal cardiac silhouette.   XR Cervical Spine 2/3 Views    Narrative    XR CERVICAL SPINE 2/3 VIEWS 6/9/2023 11:45 AM    History: neck pain     Comparison: None    Findings: AP, lateral, and odontoid views of the cervical spine were  obtained. Trace retrolisthesis at C3-4. Mild loss of disc height at  C3-4 and C4-5. Multilevel facet joint arthropathy and endplate  hypertrophy, most pronounced at C3-4 and C4-5 and greatest on the  left. The lateral masses of C1 are aligned with C2. No prevertebral  edema. No mass in the visualized lung apices.      Impression    Impression:  Multilevel cervical spondylosis, most pronounced at C3-4 and C4-5.     I have personally reviewed the examination and initial interpretation  and I agree with the findings.    FERNIE RAY MD         SYSTEM ID:  [id number]       Discharge Medications   Current Discharge Medication List      START taking these medications     Details   acetaminophen (TYLENOL) 325 MG tablet Take 2 tablets (650 mg) by mouth every 6 hours as needed for mild pain or fever  Qty: 60 tablet, Refills: 0    Associated Diagnoses: Myalgia      cefdinir (OMNICEF) 300 MG capsule Take 1 capsule (300 mg) by mouth 2 times daily for 2 days  Qty: 4 capsule, Refills: 0    Associated Diagnoses: Pneumonia of left lower lobe due to infectious organism      menthol, Topical Analgesic, 2.5% (BENGAY VANISHING SCENT) 2.5 % GEL topical gel Apply topically every 6 hours as needed for moderate pain  Qty: 85 g, Refills: 0    Associated Diagnoses: Myalgia         CONTINUE these medications which have NOT CHANGED    Details   amLODIPine (NORVASC) 10 MG tablet Take 10 mg by mouth daily      atorvastatin (LIPITOR) 20 MG tablet Take 20 mg by mouth daily      clobetasol (TEMOVATE) 0.05 % external ointment Apply topically 2 times daily as needed      ketoconazole (NIZORAL) 2 % external shampoo Apply topically daily as needed for itching or irritation      losartan (COZAAR) 100 MG tablet Take 100 mg by mouth daily      metFORMIN (GLUCOPHAGE) 1000 MG tablet Take 1,000 mg by mouth 2 times daily (with meals)      omeprazole (PRILOSEC) 20 MG DR capsule Take 20 mg by mouth daily      polyethylene glycol (MIRALAX) 17 g packet Take 17 g by mouth daily as needed for constipation      propranolol ER (INDERAL LA) 60 MG 24 hr capsule Take 60 mg by mouth daily      tamsulosin (FLOMAX) 0.4 MG capsule Take 0.4 mg by mouth daily      Vitamin D3 (CHOLECALCIFEROL) 25 mcg (1000 units) tablet Take 25 mcg by mouth daily      aspirin 81 MG EC tablet Take 81 mg by mouth daily      blood glucose monitoring (PRISCILLA CONTOUR NEXT) test strip Test  daily.  Qty: 100 each, Refills: 0    Comments: MUST BE SEEN IN CLINIC FOR FURTHER REFILLS.  CALL [phone] TO SCHEDULE.  Associated Diagnoses: Type 2 diabetes mellitus with hyperglycemia, unspecified long term insulin use status      blood glucose monitoring (PRISCILLA  MICROLET) lancets Use to test daily  Qty: 1 Box, Refills: 3    Associated Diagnoses: DM type 2 (diabetes mellitus, type 2) (H)           Allergies   No Known Allergies

## 2023-06-09 NOTE — PROVIDER NOTIFICATION
[facility] lab called & sputum was too little, canceled the labs, PCR TB complex & AFB culture.  do you want to reorder?  also maybe order a resp Neb for RT to induce more sputum?  Thanks  Giselle  [phone]

## 2023-06-12 ENCOUNTER — PATIENT OUTREACH (OUTPATIENT)
Dept: CARE COORDINATION | Facility: CLINIC | Age: 78
End: 2023-06-12
Payer: COMMERCIAL

## 2023-06-12 NOTE — PROGRESS NOTES
[facility] Care Resource Center Contact  [facility]/Voicemail     Clinical Data: Transitional Care Management Outreach     Outreach attempted x 2.  Called patient through [employer] [place]  [employer], ID# [id number], and left message on patient's voicemail, providing [facility]'s 24/7 scheduling and nurse triage phone number [phone] ([phone]) for questions/concerns and/or to schedule an appt with an [facility] provider, if they do not have a PCP.      Plan:  [facility] will do no further outreaches at this time.       Celina Read RN  Connected Care Resource Center, [facility]    *Connected Care Resource Team does NOT follow patient ongoing. Referrals are identified based on internal discharge reports and the outreach is to ensure patient has an understanding of their discharge instructions.

## 2023-06-26 ENCOUNTER — DOCUMENTATION ONLY (OUTPATIENT)
Facility: CLINIC | Age: 78
End: 2023-06-26
Payer: COMMERCIAL

## 2023-06-26 DIAGNOSIS — R26.89 IMPAIRED GAIT AND MOBILITY: Primary | ICD-10-CM
